# Patient Record
Sex: MALE | Race: WHITE | NOT HISPANIC OR LATINO | Employment: FULL TIME | URBAN - METROPOLITAN AREA
[De-identification: names, ages, dates, MRNs, and addresses within clinical notes are randomized per-mention and may not be internally consistent; named-entity substitution may affect disease eponyms.]

---

## 2020-05-07 ENCOUNTER — OFFICE VISIT (OUTPATIENT)
Dept: PODIATRY | Facility: CLINIC | Age: 45
End: 2020-05-07
Payer: COMMERCIAL

## 2020-05-07 VITALS
SYSTOLIC BLOOD PRESSURE: 147 MMHG | HEART RATE: 109 BPM | BODY MASS INDEX: 53.75 KG/M2 | DIASTOLIC BLOOD PRESSURE: 88 MMHG | HEIGHT: 75 IN

## 2020-05-07 DIAGNOSIS — M79.675 PAIN DUE TO ONYCHOMYCOSIS OF TOENAILS OF BOTH FEET: ICD-10-CM

## 2020-05-07 DIAGNOSIS — E11.65 UNCONTROLLED TYPE 2 DIABETES MELLITUS WITH HYPERGLYCEMIA: ICD-10-CM

## 2020-05-07 DIAGNOSIS — M79.675 TOE PAIN, BILATERAL: ICD-10-CM

## 2020-05-07 DIAGNOSIS — M79.674 PAIN DUE TO ONYCHOMYCOSIS OF TOENAILS OF BOTH FEET: ICD-10-CM

## 2020-05-07 DIAGNOSIS — B35.1 PAIN DUE TO ONYCHOMYCOSIS OF TOENAILS OF BOTH FEET: ICD-10-CM

## 2020-05-07 DIAGNOSIS — L60.2 NAIL DISORDER (ONYCHOGRYPHOSIS): Primary | ICD-10-CM

## 2020-05-07 DIAGNOSIS — M79.674 TOE PAIN, BILATERAL: ICD-10-CM

## 2020-05-07 PROCEDURE — 3008F BODY MASS INDEX DOCD: CPT | Mod: CPTII,S$GLB,, | Performed by: PODIATRIST

## 2020-05-07 PROCEDURE — 99999 PR PBB SHADOW E&M-NEW PATIENT-LVL III: ICD-10-PCS | Mod: PBBFAC,,, | Performed by: PODIATRIST

## 2020-05-07 PROCEDURE — 99203 PR OFFICE/OUTPT VISIT, NEW, LEVL III, 30-44 MIN: ICD-10-PCS | Mod: 25,S$GLB,, | Performed by: PODIATRIST

## 2020-05-07 PROCEDURE — 99203 OFFICE O/P NEW LOW 30 MIN: CPT | Mod: 25,S$GLB,, | Performed by: PODIATRIST

## 2020-05-07 PROCEDURE — 99999 PR PBB SHADOW E&M-NEW PATIENT-LVL III: CPT | Mod: PBBFAC,,, | Performed by: PODIATRIST

## 2020-05-07 PROCEDURE — 11721 PR DEBRIDEMENT OF NAILS, 6 OR MORE: ICD-10-PCS | Mod: S$GLB,,, | Performed by: PODIATRIST

## 2020-05-07 PROCEDURE — 3008F PR BODY MASS INDEX (BMI) DOCUMENTED: ICD-10-PCS | Mod: CPTII,S$GLB,, | Performed by: PODIATRIST

## 2020-05-07 PROCEDURE — 11721 DEBRIDE NAIL 6 OR MORE: CPT | Mod: S$GLB,,, | Performed by: PODIATRIST

## 2020-05-07 RX ORDER — CICLOPIROX 80 MG/ML
SOLUTION TOPICAL
Qty: 1 BOTTLE | Refills: 10 | Status: SHIPPED | OUTPATIENT
Start: 2020-05-07

## 2020-05-07 RX ORDER — PRAVASTATIN SODIUM 20 MG/1
20 TABLET ORAL DAILY
COMMUNITY

## 2020-05-07 RX ORDER — RAMIPRIL 10 MG/1
10 CAPSULE ORAL DAILY
COMMUNITY

## 2020-05-07 RX ORDER — METFORMIN HYDROCHLORIDE 1000 MG/1
1000 TABLET ORAL 2 TIMES DAILY WITH MEALS
COMMUNITY

## 2020-05-07 RX ORDER — INSULIN GLARGINE 300 U/ML
INJECTION, SOLUTION SUBCUTANEOUS
COMMUNITY
Start: 2020-01-31

## 2020-05-07 RX ORDER — GLIMEPIRIDE 2 MG/1
2 TABLET ORAL
COMMUNITY

## 2020-05-07 NOTE — PATIENT INSTRUCTIONS
PENLAC ANTI-FUNGAL TOPICAL INSTRUCTIONS   1. You have been prescribed Penlac nail lacquer (Ciclopirox) topical solution 8%. Go and  the prescription from your local pharmacy.    2. Remove any nail polish on your feet. File away (with emery board) loose nail material and trim nails.    3. Apply the Penlac nail lacquer (ciclopirox) Topical Solution, 8% once daily (preferably at bedtime or eight hours before washing) to all affected nails with the applicator brush provided. The nail lacquer should be applied evenly over the entire nail plate. If possible, apply the solution to the nail bed, hyponychium, and the under surface of the nail plate when if your nail is loosely attached. Remember fungus lives under the nail plate, therefore the more the solution penetrates the nail bed, the better.    3. Continue to apply the solution daily (at bedtime preferably).  Daily applications should be made over the previous coat and removed with alcohol every seven days. This cycle should be repeated throughout the duration of therapy.     4. On the 7th day, remove the solution from all of your nails with alcohol. File your nails again with an emery board and then repeat the cycle again.    5. This treatment must be consistent. If you skip a day, continue the cycle as recommended. It may take up to 1 year for your nails to clear the fungal infection. Be patient.    It is recommended to dispose of all infected shoe gear that you will not likely wear again as well as weekly cleansing of all showering/bathing areas with antiseptics.Fungal spores like to hide in dark, warm, moist environments. Lastly, monthly treatments of all current shoe gear with moth balls x 8 hours.                  FUNGAL NAIL INFECTIONS    If your nail is thick and looks white or yellow, it may be infected with fungus. Nail   infections don't look pleasant, but they are not serious. There are treatments that   can clear up the infection.     What is a  fungal nail infection?   It's easy to catch a fungal nail infection, and lots of people get them. The sooner you   treat an infection, the easier it is to get rid of it. The fungi that cause these infections often live in warm, damp places, such as showers and floors around changing rooms.   People used to think there was nothing you could do about a fungal nail infection. But   there are now good treatments that can get rid of it. However, they take a long time to   work (as long as one year if the infection is bad). So you need to be patient.    Fungal infection of the nails causes changes in the appearance of fingernails and toenails. They may thicken, discolor, change shape or split. This condition is difficult to treat because nails grow slowly and have limited blood supply. It is common to have the infection come back after treatment.       What are the symptoms?   Your nail may look whitish or yellowish, and raised up from the finger or toe. The skin   around your nail may turn red. Sometimes the nail lifts off altogether. If the infection is   severe, the nail may also be crumbly. It's more common to get an infection of a toenail   than a fingernail. If you've had an infection for a long time, it may be painful. If you have a badly infected toe, it may be difficult to walk. If your immune system is weak or you have diabetes, you may be more likely to get these infections. The infection can also be more serious. So it's important to see your doctor as soon as possible if you think you have a nail infection.     What treatments work?   To get rid of a fungal nail infection you will probably have to take tablets, sometimes for   several months. There are also topical solutions (over the counter and prescription) that  you can put on your nail, and  things you can do to try to avoid getting another nail infection.    There are two types of medicines used.   Topical anti-fungal medicines are applied to the  "surface of the skin and nail area. These medicines are not very effective because they cannot get deep into the nail.     Topical medicines are most useful in combination with oral medicines . Oral antifungal medicines are more effective because they penetrate the nail from the inside out.  If medicines fail, the nail can be removed surgically or chemically. This improves the effectiveness of medical treatment because the fungus is physically removed from the body.       Tablets   The tablets that doctors use most often are called itraconazole (brand name Sporanox)   and terbinafine (Lamisil). Terbinafine seems to work slightly better. If you take terbinafine every day for 12 weeks, there's a 6 in 10 chance you'll get rid of   your fungal toenail infection.      How are fungal nail infections treated? -- Treatment depends, in part, on how severe the infection is, and how much it bothers you. If your infection is mild or doesn't bother you very much, you might choose not to treat it. An untreated nail infection probably won't go away, but it probably won't cause any long-term problems either.  When people need or choose to have treatment, it usually involves "antifungal" medicines that you get with a prescription from your doctor. These medicines are taken by mouth or put on the nail.Treatment with pills usually lasts a few months. Some people who take these medicines need to have blood tests. That's because these medicines can affect the liver.  If you don't want to or can't take antifungal pills, your doctor will talk with you about other treatment options. These might include using an antifungal medicine on the nail or having surgery to remove your nail.    Before starting any of these treatments, you should know that:  ?It can take many months for your nail to look normal again.  ?There is a chance that the treatment won't work. The infection might not get better, or it might come back. If either of these things " happen, your doctor can try another treatment or send you to a specialist.  Can fungal nail infections be prevented? -- Sometimes. To reduce your chance of getting one, you can:  ?Keep your feet clean and dry.  ?Avoid sharing nail tools, such as clippers and scissors.  ?Wear flip-flops or other footwear in a gym shower or locker room.  What if I want to get pregnant? -- If you want to get pregnant, let your doctor or nurse know. He or she might recommend that you not take certain antifungal medicines during pregnancy.    Alternative final options are:   If still no resolution with oral tablets or topics: then we can completely avulse/remove all involved toenails with subsequent treatment with topical antifungal solution.       Home Care:   1) Use medicines exactly as directed for as long as directed. Treating a fungal infection can take longer than other kinds of infections.   2) Smoking is a risk factor for fungal infection. This is one more reason to quit.   3) Wear absorbent socks and shoes that have ventilation. Sweaty feet increases risk of fungal infection and make an existing infection harder to treat.   4) Use footwear when in damp public places like swimming pools, gyms and shower rooms. This helps avoid exposure to the fungus that grows there.   It is recommended to dispose of all infected shoe gear that you will not likely wear again as well as weekly cleansing of all showering/bathing areas with antiseptics.Fungal spores like to hide in dark, warm, moist environments. Lastly, monthly treatments of all current shoe gear with moth balls x 8 hours.       Follow Up   with your doctor as directed by our staff.   Get Prompt Medical Attention   if any of the following occur:   -- Skin alongside the nail becomes reddened, swollen, painful or drains pus   -- Side effects from oral anti-fungal medicines       © 3883-7650 [employer]. [address] [place] [address], [place] [address]. All rights reserved.  This information is not intended as a substitute for professional medical care. Always follow your healthcare professional's instructions.

## 2020-05-07 NOTE — PROGRESS NOTES
PODIATRIC MEDICINE AND SURGERY     Kirk Padilla MD- PCP, last Visit 1/2020    CHIEF COMPLAINT  Chief Complaint   Patient presents with    Ingrown Toenail     Bilateral hallux, bilateral borders incurvated. Left hallux lateral border tender to touch at times         HPI    SUBJECTIVE: Sebastian Ken is a 45 y.o. male who  has a past medical history of Diabetes mellitus, Lipodermatosclerosis, and Sciatica. Sebastian presenting to podiatry clinic with complaint of thickened, discolored, and painful toenails. Pt states nails result in pain with enclosed shoe wear aggravated due to pressure  and/or with ambulation. Left great toenail is the most painful. They are unable to trim nails. They are requesting nail care for relief of painful symptoms. Patient has no further pedal complaints.    Hemoglobin A1C   Date Value Ref Range Status   02/19/2011 10.1 (H) 4.0 - 6.2 % Final         PMH  Past Medical History:   Diagnosis Date    Diabetes mellitus     Lipodermatosclerosis     Sciatica      There is no problem list on file for this patient.      MEDS  Current Outpatient Medications on File Prior to Visit   Medication Sig Dispense Refill    glimepiride (AMARYL) 2 MG tablet Take 2 mg by mouth before breakfast.      metFORMIN (GLUCOPHAGE) 1000 MG tablet Take 1,000 mg by mouth 2 (two) times daily with meals.      naproxen (NAPROSYN) 500 MG tablet Take 1 tablet (500 mg total) by mouth 2 (two) times daily with meals. 20 tablet 0    pravastatin (PRAVACHOL) 20 MG tablet Take 20 mg by mouth once daily.      ramipriL (ALTACE) 10 MG capsule Take 10 mg by mouth once daily.      TOUJEO SOLOSTAR U-300 INSULIN 300 unit/mL (1.5 mL) InPn pen INJECT 40 UNITS UNDER THE SKIN ONCE D      naproxen (NAPROSYN) 500 MG tablet Take 1 tablet (500 mg total) by mouth 2 (two) times daily with meals. 20 tablet 0     No current facility-administered medications on file prior to visit.        PSH     Past Surgical History:   Procedure  "Laterality Date    CHOLECYSTECTOMY      KNEE SURGERY          ALL  Review of patient's allergies indicates:  No Known Allergies    SOC     Social History     Tobacco Use    Smoking status: Never Smoker   Substance Use Topics    Alcohol use: No    Drug use: No         Family HX  History reviewed. No pertinent family history.         REVIEW OF SYSTEMS  General: Denies any fever or chills  Chest: Denies shortness of breath, wheezing, coughing, or sputum production  Heart: Denies chest pain.  As noted above and per history of current illness above, otherwise negative in the remainder of the 14 systems.     PHYSICAL EXAM  Vitals:    05/07/20 1444   BP: (!) 147/88   Pulse: 109   Height: 6' 3" (1.905 m)   PainSc: 0-No pain       GEN:  This patient is well-developed, well-nourished and appears stated age, well-oriented to person, place and time, and cooperative and pleasant on today's visit.      LOWER EXTREMITY    VASCULAR  DP pedal pulse 2/4 RIGHT, LEFT2/4   PT pedal pulse 2/4 RIGHT, LEFT2/4  Capillary refill time immediate to the toes.   Feet are warm to the touch. Skin temperature warm to warm from proximally to distally   There are varicosities, telangiectasias noted to bilateral foot and ankle regions.   There are no ecchymoses noted to bilateral foot and ankle regions.   There is gross lower extremity edema.  There are chronic venous stasis changes     DERMATOLOGIC  Skin moist with healthy texture and turgor.  Thickened, dystrophic, elongated, discolored toenails with subungal debris 1,2, 5 RIGHT FOOT, 1, 2, , 5 LEFT FOOT    There are no open ulcerations, lacerations, or fissures to bilateral foot and ankle regions. There are no signs of infection as there is no erythema, no proximal-extending lymphangiitis, no fluctuance, or crepitus noted on palpation to bilateral foot and ankle regions.   There is no interdigital maceration.   There are no hyperkeratotic lesions noted to feet.    NEUROLOGIC  Neurological " sensation is grossly intact to all sites tested    ORTHOPEDIC/BIOMECHANICAL  No symptomatic structural abnormalities noted. Muscle strength is 5/5 for foot inverters, everters, plantarflexors, and dorsiflexors. Muscle tone is normal.   Inspection/palpation of bone, joints and muscles unremarkable.    ASSESSMENT  Nail disorder (onychogryphosis)    Uncontrolled type 2 diabetes mellitus with hyperglycemia    Pain due to onychomycosis of toenails of both feet    Other orders  -     ciclopirox (PENLAC) 8 % Soln; Apply to affected toenails at night time DAILY. On 7th day, file nails down, clean all nails with alcohol and restart application process.  Dispense: 1 Bottle; Refill: 10  -     urea (UMECTA NAIL FILM PEN) 40 % NFSP; Apply 1 application topically 2 (two) times daily. To affected toenail  Dispense: 3 g; Refill: 10        PLAN  -The patient was examined and evaulated. Patient was given verbal instructions on maintenance care for mycotic nails. The need for proper skin care in order to prevent infection and colonization in the nails was explained to the patient.    - The nails are painful with applied pressure and shoe gear. If a mycotic infection is left untreated, the infection could spread, causing marked limitation of ambulation, and/or make the patient prone to secondary infection. Failure to debride the nails at necessary intervals could likely cause recurrence of pain.   -I recommend Vicks vapor topically once daily to affected toenails. This is a natural antifungal regimen that will help in reduction of fungal load and will also soften the nail to allow for easier debridements.   -With patient's permission, the elongated onychomycotic toenails, as outlined in the physical examination, were sharply debrided with a double action nail nipper to their soft tissue attachment. If indicated, the nails were then smoothed down in thickness with an [place] board to facilitate in further debridement removing all  offending nail and subungual debris. Patient relates relief following the procedure.   -discussed total nail removal for LEFT  -discussed diabetic blood sugar control    Disclaimer: This note was partially prepared using a voice recognition system and is likely to have sound alike errors within the text.        No future appointments.    Report Electronically Signed By:     Elissa Landis DPM   Podiatry  [facility]  5/7/2020

## 2020-07-21 ENCOUNTER — HOSPITAL ENCOUNTER (EMERGENCY)
Facility: HOSPITAL | Age: 45
Discharge: HOME | End: 2020-07-21
Attending: EMERGENCY MEDICINE
Payer: COMMERCIAL

## 2020-07-21 VITALS
SYSTOLIC BLOOD PRESSURE: 136 MMHG | BODY MASS INDEX: 49.05 KG/M2 | DIASTOLIC BLOOD PRESSURE: 80 MMHG | OXYGEN SATURATION: 97 % | HEART RATE: 94 BPM | WEIGHT: 315 LBS | RESPIRATION RATE: 16 BRPM | TEMPERATURE: 98 F

## 2020-07-21 DIAGNOSIS — R07.9 CHEST PAIN: ICD-10-CM

## 2020-07-21 DIAGNOSIS — R07.89 ATYPICAL CHEST PAIN: Primary | ICD-10-CM

## 2020-07-21 LAB
ALBUMIN SERPL BCP-MCNC: 3.7 G/DL (ref 3.5–5.2)
ALP SERPL-CCNC: 116 U/L (ref 55–135)
ALT SERPL W/O P-5'-P-CCNC: 50 U/L (ref 10–44)
ANION GAP SERPL CALC-SCNC: 12 MMOL/L (ref 8–16)
AST SERPL-CCNC: 27 U/L (ref 10–40)
BASOPHILS # BLD AUTO: 0.06 K/UL (ref 0–0.2)
BASOPHILS NFR BLD: 0.6 % (ref 0–1.9)
BILIRUB SERPL-MCNC: 0.6 MG/DL (ref 0.1–1)
BNP SERPL-MCNC: <10 PG/ML (ref 0–99)
BUN SERPL-MCNC: 14 MG/DL (ref 6–20)
CALCIUM SERPL-MCNC: 9.4 MG/DL (ref 8.7–10.5)
CHLORIDE SERPL-SCNC: 101 MMOL/L (ref 95–110)
CO2 SERPL-SCNC: 24 MMOL/L (ref 23–29)
CREAT SERPL-MCNC: 0.8 MG/DL (ref 0.5–1.4)
DIFFERENTIAL METHOD: ABNORMAL
EOSINOPHIL # BLD AUTO: 0.3 K/UL (ref 0–0.5)
EOSINOPHIL NFR BLD: 3 % (ref 0–8)
ERYTHROCYTE [DISTWIDTH] IN BLOOD BY AUTOMATED COUNT: 14.7 % (ref 11.5–14.5)
EST. GFR  (AFRICAN AMERICAN): >60 ML/MIN/1.73 M^2
EST. GFR  (NON AFRICAN AMERICAN): >60 ML/MIN/1.73 M^2
GLUCOSE SERPL-MCNC: 345 MG/DL (ref 70–110)
HCT VFR BLD AUTO: 45.7 % (ref 40–54)
HGB BLD-MCNC: 15.1 G/DL (ref 14–18)
IMM GRANULOCYTES # BLD AUTO: 0.08 K/UL (ref 0–0.04)
IMM GRANULOCYTES NFR BLD AUTO: 0.9 % (ref 0–0.5)
LYMPHOCYTES # BLD AUTO: 3.8 K/UL (ref 1–4.8)
LYMPHOCYTES NFR BLD: 40.6 % (ref 18–48)
MCH RBC QN AUTO: 27.5 PG (ref 27–31)
MCHC RBC AUTO-ENTMCNC: 33 G/DL (ref 32–36)
MCV RBC AUTO: 83 FL (ref 82–98)
MONOCYTES # BLD AUTO: 0.5 K/UL (ref 0.3–1)
MONOCYTES NFR BLD: 5.2 % (ref 4–15)
NEUTROPHILS # BLD AUTO: 4.6 K/UL (ref 1.8–7.7)
NEUTROPHILS NFR BLD: 49.7 % (ref 38–73)
NRBC BLD-RTO: 0 /100 WBC
PLATELET # BLD AUTO: 221 K/UL (ref 150–350)
PMV BLD AUTO: 10 FL (ref 9.2–12.9)
POTASSIUM SERPL-SCNC: 4.3 MMOL/L (ref 3.5–5.1)
PROT SERPL-MCNC: 7.1 G/DL (ref 6–8.4)
RBC # BLD AUTO: 5.49 M/UL (ref 4.6–6.2)
SODIUM SERPL-SCNC: 137 MMOL/L (ref 136–145)
TROPONIN I SERPL DL<=0.01 NG/ML-MCNC: <0.006 NG/ML (ref 0–0.03)
WBC # BLD AUTO: 9.31 K/UL (ref 3.9–12.7)

## 2020-07-21 PROCEDURE — 99285 EMERGENCY DEPT VISIT HI MDM: CPT | Mod: 25

## 2020-07-21 PROCEDURE — 93010 ELECTROCARDIOGRAM REPORT: CPT | Mod: ,,, | Performed by: INTERNAL MEDICINE

## 2020-07-21 PROCEDURE — 93005 ELECTROCARDIOGRAM TRACING: CPT

## 2020-07-21 PROCEDURE — 93010 EKG 12-LEAD: ICD-10-PCS | Mod: ,,, | Performed by: INTERNAL MEDICINE

## 2020-07-21 PROCEDURE — 83880 ASSAY OF NATRIURETIC PEPTIDE: CPT

## 2020-07-21 PROCEDURE — 85025 COMPLETE CBC W/AUTO DIFF WBC: CPT

## 2020-07-21 PROCEDURE — 84484 ASSAY OF TROPONIN QUANT: CPT

## 2020-07-21 PROCEDURE — 36415 COLL VENOUS BLD VENIPUNCTURE: CPT

## 2020-07-21 PROCEDURE — 80053 COMPREHEN METABOLIC PANEL: CPT

## 2020-07-21 NOTE — ED PROVIDER NOTES
"SCRIBE #1 NOTE: I, Farheen Parra, am scribing for, and in the presence of, Reece Ramirez Do, MD. I have scribed the entire note.       History     Chief Complaint   Patient presents with    Chest Pain     Pt reports "fluttering" feeling in L breast and "weirdness" radiating L arm; onset couple hours PTA     Review of patient's allergies indicates:  No Known Allergies      History of Present Illness     HPI    7/21/2020, 3:44 AM  History obtained from the patient      History of Present Illness: Sebastian Ken is a 45 y.o. male patient with a PMHx of DM, sciatica, and heart cath (3-4 yrs ago with no blockages) who presents to the Emergency Department for evaluation of chest discomfort which onset suddenly at 10:00 PM yesterday. He reports it does not hurt and there is no pressure but "it just feels funny and feels like a fluttering sensation." Symptoms are intermittent and moderate in severity. No mitigating or exacerbating factors reported. No associated sxs. Patient denies any SOB, diaphoresis, palpitations, extremity weakness/numbness, leg pain/swelling, dizziness, cough, N/V, and all other sxs at this time. No prior Tx. No further complaints or concerns at this time.       Arrival mode: Personal vehicle    PCP: Kirk Padilla MD        Past Medical History:  Past Medical History:   Diagnosis Date    Diabetes mellitus     Lipodermatosclerosis     Sciatica        Past Surgical History:  Past Surgical History:   Procedure Laterality Date    CHOLECYSTECTOMY      KNEE SURGERY           Family History:  No family history on file.    Social History:  Social History     Tobacco Use    Smoking status: Never Smoker   Substance and Sexual Activity    Alcohol use: No    Drug use: No    Sexual activity: Not on file        Review of Systems     Review of Systems   Constitutional: Negative for activity change, chills, diaphoresis and fever.   HENT: Negative for congestion, rhinorrhea, sneezing, sore throat " and trouble swallowing.    Eyes: Negative for pain.   Respiratory: Negative for cough, chest tightness, shortness of breath, wheezing and stridor.    Cardiovascular: Negative for chest pain, palpitations and leg swelling.        (+) chest discomfort   Gastrointestinal: Negative for abdominal distention, abdominal pain, constipation, diarrhea, nausea and vomiting.   Genitourinary: Negative for difficulty urinating, dysuria, frequency and urgency.   Musculoskeletal: Negative for arthralgias, back pain, myalgias, neck pain and neck stiffness.   Skin: Negative for pallor, rash and wound.   Neurological: Negative for dizziness, syncope, weakness, light-headedness, numbness and headaches.   Hematological: Does not bruise/bleed easily.   All other systems reviewed and are negative.     Physical Exam     Initial Vitals [07/21/20 0016]   BP Pulse Resp Temp SpO2   (!) 174/96 88 20 98.3 °F (36.8 °C) 97 %      MAP       --          Physical Exam  Nursing Notes and Vital Signs Reviewed.  Constitutional: Patient is in no acute distress. Obese.   Head: Atraumatic. Normocephalic.  Eyes: . EOM intact. Conjunctivae are not pale. No scleral icterus.  ENT: Mucous membranes are moist. Oropharynx is clear and symmetric.    Neck: Supple. Full ROM.   Cardiovascular: Regular rate. Regular rhythm. No murmurs, rubs, or gallops. Distal pulses are 2+ and symmetric.  Pulmonary/Chest: No respiratory distress. Clear to auscultation bilaterally. No wheezing or rales. No chest wall tenderness.   Abdominal: Soft and non-distended.  There is no tenderness.  No rebound, guarding, or rigidity. Good bowel sounds.  Genitourinary: No CVA tenderness  Musculoskeletal: Moves all extremities. No obvious deformities. No edema. No calf tenderness. Meyers to lower legs consistent with venous stasis. Good pulses in BLE.   Skin: Warm and dry.  Neurological:  Alert, awake, and appropriate.  Normal speech.  No acute focal neurological deficits are  appreciated.  Psychiatric: Normal affect. Good eye contact. Appropriate in content.     ED Course   Procedures  ED Vital Signs:  Vitals:    07/21/20 0016 07/21/20 0259 07/21/20 0330 07/21/20 0430   BP: (!) 174/96  (!) 144/77 138/79   Pulse: 88 85 91 95   Resp: 20  20 10   Temp: 98.3 °F (36.8 °C)      TempSrc: Oral      SpO2: 97%  98% 98%   Weight: (!) 178 kg (392 lb 6.7 oz)       07/21/20 0500   BP: 136/80   Pulse: 94   Resp: 16   Temp: 98 °F (36.7 °C)   TempSrc: Oral   SpO2: 97%   Weight:        Abnormal Lab Results:  Labs Reviewed   CBC W/ AUTO DIFFERENTIAL - Abnormal; Notable for the following components:       Result Value    RDW 14.7 (*)     Immature Granulocytes 0.9 (*)     Immature Grans (Abs) 0.08 (*)     All other components within normal limits   COMPREHENSIVE METABOLIC PANEL - Abnormal; Notable for the following components:    Glucose 345 (*)     ALT 50 (*)     All other components within normal limits   TROPONIN I   B-TYPE NATRIURETIC PEPTIDE        All Lab Results:  Results for orders placed or performed during the hospital encounter of 07/21/20   CBC auto differential   Result Value Ref Range    WBC 9.31 3.90 - 12.70 K/uL    RBC 5.49 4.60 - 6.20 M/uL    Hemoglobin 15.1 14.0 - 18.0 g/dL    Hematocrit 45.7 40.0 - 54.0 %    Mean Corpuscular Volume 83 82 - 98 fL    Mean Corpuscular Hemoglobin 27.5 27.0 - 31.0 pg    Mean Corpuscular Hemoglobin Conc 33.0 32.0 - 36.0 g/dL    RDW 14.7 (H) 11.5 - 14.5 %    Platelets 221 150 - 350 K/uL    MPV 10.0 9.2 - 12.9 fL    Immature Granulocytes 0.9 (H) 0.0 - 0.5 %    Gran # (ANC) 4.6 1.8 - 7.7 K/uL    Immature Grans (Abs) 0.08 (H) 0.00 - 0.04 K/uL    Lymph # 3.8 1.0 - 4.8 K/uL    Mono # 0.5 0.3 - 1.0 K/uL    Eos # 0.3 0.0 - 0.5 K/uL    Baso # 0.06 0.00 - 0.20 K/uL    nRBC 0 0 /100 WBC    Gran% 49.7 38.0 - 73.0 %    Lymph% 40.6 18.0 - 48.0 %    Mono% 5.2 4.0 - 15.0 %    Eosinophil% 3.0 0.0 - 8.0 %    Basophil% 0.6 0.0 - 1.9 %    Differential Method Automated     Comprehensive metabolic panel   Result Value Ref Range    Sodium 137 136 - 145 mmol/L    Potassium 4.3 3.5 - 5.1 mmol/L    Chloride 101 95 - 110 mmol/L    CO2 24 23 - 29 mmol/L    Glucose 345 (H) 70 - 110 mg/dL    BUN, Bld 14 6 - 20 mg/dL    Creatinine 0.8 0.5 - 1.4 mg/dL    Calcium 9.4 8.7 - 10.5 mg/dL    Total Protein 7.1 6.0 - 8.4 g/dL    Albumin 3.7 3.5 - 5.2 g/dL    Total Bilirubin 0.6 0.1 - 1.0 mg/dL    Alkaline Phosphatase 116 55 - 135 U/L    AST 27 10 - 40 U/L    ALT 50 (H) 10 - 44 U/L    Anion Gap 12 8 - 16 mmol/L    eGFR if African American >60 >60 mL/min/1.73 m^2    eGFR if non African American >60 >60 mL/min/1.73 m^2   Troponin I #1   Result Value Ref Range    Troponin I <0.006 0.000 - 0.026 ng/mL   B-Type natriuretic peptide (BNP)   Result Value Ref Range    BNP <10 0 - 99 pg/mL         Imaging Results:  Imaging Results          X-Ray Chest AP Portable (In process)                5:07 AM: Per ED provider, pt's CXR shows: Cardiomegaly. No focal infiltrates.     The EKG was ordered, reviewed, and independently interpreted by the ED provider.  Interpretation time: 0:26  Rate: 89 BPM  Rhythm: normal sinus rhythm  Interpretation: No acute ST changes. No STEMI.           The Emergency Provider reviewed the vital signs and test results, which are outlined above.     ED Discussion     5:06 AM: Reassessed pt at this time. Discussed with pt all pertinent ED information and results. Discussed pt dx and plan of tx. Gave pt all f/u and return to the ED instructions. All questions and concerns were addressed at this time. Pt expresses understanding of information and instructions, and is comfortable with plan to discharge. Pt is stable for discharge.    I discussed with patient and/or family/caretaker that evaluation in the ED does not suggest any emergent or life threatening medical conditions requiring immediate intervention beyond what was provided in the ED, and I believe patient is safe for discharge.   Regardless, an unremarkable evaluation in the ED does not preclude the development or presence of a serious of life threatening condition. As such, patient was instructed to return immediately for any worsening or change in current symptoms.                   ED Medication(s):  Medications - No data to display    New Prescriptions    No medications on file       Follow-up Information     Kirk Padilla MD In 2 days.    Specialty: Family Medicine  Contact information:  [id number]  [facility] [address]  [phone]                       Scribe Attestation:   Scribe #1: I performed the above scribed service and the documentation accurately describes the services I performed. I attest to the accuracy of the note.     Attending:   Physician Attestation Statement for Scribe #1: I, Reece Ramirez Do, MD, personally performed the services described in this documentation, as scribed by Farheen Parra, in my presence, and it is both accurate and complete.           Clinical Impression       ICD-10-CM ICD-9-CM   1. Atypical chest pain  R07.89 786.59   2. Chest pain  R07.9 786.50       Disposition:   Disposition: Discharged  Condition: Stable         Reece Ramirez Do, MD  07/21/20 0538

## 2021-01-14 ENCOUNTER — OFFICE VISIT (OUTPATIENT)
Dept: PODIATRY | Facility: CLINIC | Age: 46
End: 2021-01-14
Payer: COMMERCIAL

## 2021-01-14 VITALS
BODY MASS INDEX: 39.17 KG/M2 | WEIGHT: 315 LBS | HEART RATE: 102 BPM | HEIGHT: 75 IN | DIASTOLIC BLOOD PRESSURE: 98 MMHG | SYSTOLIC BLOOD PRESSURE: 154 MMHG

## 2021-01-14 DIAGNOSIS — M79.674 PAIN DUE TO ONYCHOMYCOSIS OF TOENAILS OF BOTH FEET: ICD-10-CM

## 2021-01-14 DIAGNOSIS — E11.65 UNCONTROLLED TYPE 2 DIABETES MELLITUS WITH HYPERGLYCEMIA: Primary | ICD-10-CM

## 2021-01-14 DIAGNOSIS — L84 CORN OR CALLUS: ICD-10-CM

## 2021-01-14 DIAGNOSIS — L60.2 NAIL DISORDER (ONYCHOGRYPHOSIS): ICD-10-CM

## 2021-01-14 DIAGNOSIS — L60.0 INGROWN TOENAIL OF RIGHT FOOT: ICD-10-CM

## 2021-01-14 DIAGNOSIS — M79.675 PAIN DUE TO ONYCHOMYCOSIS OF TOENAILS OF BOTH FEET: ICD-10-CM

## 2021-01-14 DIAGNOSIS — E11.42 DM TYPE 2 WITH DIABETIC PERIPHERAL NEUROPATHY: ICD-10-CM

## 2021-01-14 DIAGNOSIS — B35.1 PAIN DUE TO ONYCHOMYCOSIS OF TOENAILS OF BOTH FEET: ICD-10-CM

## 2021-01-14 PROCEDURE — 3008F PR BODY MASS INDEX (BMI) DOCUMENTED: ICD-10-PCS | Mod: CPTII,S$GLB,, | Performed by: PODIATRIST

## 2021-01-14 PROCEDURE — 99214 OFFICE O/P EST MOD 30 MIN: CPT | Mod: 25,S$GLB,, | Performed by: PODIATRIST

## 2021-01-14 PROCEDURE — 11721 DEBRIDE NAIL 6 OR MORE: CPT | Mod: S$GLB,,, | Performed by: PODIATRIST

## 2021-01-14 PROCEDURE — 11721 PR DEBRIDEMENT OF NAILS, 6 OR MORE: ICD-10-PCS | Mod: S$GLB,,, | Performed by: PODIATRIST

## 2021-01-14 PROCEDURE — 99214 PR OFFICE/OUTPT VISIT, EST, LEVL IV, 30-39 MIN: ICD-10-PCS | Mod: 25,S$GLB,, | Performed by: PODIATRIST

## 2021-01-14 PROCEDURE — 99999 PR PBB SHADOW E&M-EST. PATIENT-LVL III: CPT | Mod: PBBFAC,,, | Performed by: PODIATRIST

## 2021-01-14 PROCEDURE — 99999 PR PBB SHADOW E&M-EST. PATIENT-LVL III: ICD-10-PCS | Mod: PBBFAC,,, | Performed by: PODIATRIST

## 2021-01-14 PROCEDURE — 3008F BODY MASS INDEX DOCD: CPT | Mod: CPTII,S$GLB,, | Performed by: PODIATRIST

## 2021-04-28 ENCOUNTER — PATIENT MESSAGE (OUTPATIENT)
Dept: RESEARCH | Facility: HOSPITAL | Age: 46
End: 2021-04-28

## 2021-06-08 ENCOUNTER — OFFICE VISIT (OUTPATIENT)
Dept: PODIATRY | Facility: CLINIC | Age: 46
End: 2021-06-08
Payer: COMMERCIAL

## 2021-06-08 VITALS — WEIGHT: 315 LBS | BODY MASS INDEX: 39.17 KG/M2 | HEIGHT: 75 IN

## 2021-06-08 DIAGNOSIS — E11.65 UNCONTROLLED TYPE 2 DIABETES MELLITUS WITH HYPERGLYCEMIA: ICD-10-CM

## 2021-06-08 DIAGNOSIS — L60.2 NAIL DISORDER (ONYCHOGRYPHOSIS): ICD-10-CM

## 2021-06-08 DIAGNOSIS — I87.2 VENOUS INSUFFICIENCY OF BOTH LOWER EXTREMITIES: Primary | ICD-10-CM

## 2021-06-08 DIAGNOSIS — E11.42 DM TYPE 2 WITH DIABETIC PERIPHERAL NEUROPATHY: ICD-10-CM

## 2021-06-08 PROCEDURE — 99999 PR PBB SHADOW E&M-EST. PATIENT-LVL III: ICD-10-PCS | Mod: PBBFAC,,, | Performed by: PODIATRIST

## 2021-06-08 PROCEDURE — 99999 PR PBB SHADOW E&M-EST. PATIENT-LVL III: CPT | Mod: PBBFAC,,, | Performed by: PODIATRIST

## 2021-06-08 PROCEDURE — 11721 DEBRIDE NAIL 6 OR MORE: CPT | Mod: Q9,S$GLB,, | Performed by: PODIATRIST

## 2021-06-08 PROCEDURE — 3008F BODY MASS INDEX DOCD: CPT | Mod: CPTII,S$GLB,, | Performed by: PODIATRIST

## 2021-06-08 PROCEDURE — 3008F PR BODY MASS INDEX (BMI) DOCUMENTED: ICD-10-PCS | Mod: CPTII,S$GLB,, | Performed by: PODIATRIST

## 2021-06-08 PROCEDURE — 11721 PR DEBRIDEMENT OF NAILS, 6 OR MORE: ICD-10-PCS | Mod: Q9,S$GLB,, | Performed by: PODIATRIST

## 2021-06-08 PROCEDURE — 99214 PR OFFICE/OUTPT VISIT, EST, LEVL IV, 30-39 MIN: ICD-10-PCS | Mod: 25,S$GLB,, | Performed by: PODIATRIST

## 2021-06-08 PROCEDURE — 99214 OFFICE O/P EST MOD 30 MIN: CPT | Mod: 25,S$GLB,, | Performed by: PODIATRIST

## 2021-06-08 RX ORDER — INSULIN GLARGINE AND LIXISENATIDE 100; 33 U/ML; UG/ML
INJECTION, SOLUTION SUBCUTANEOUS
COMMUNITY
Start: 2021-01-25

## 2021-06-08 RX ORDER — GABAPENTIN 300 MG/1
300 CAPSULE ORAL 2 TIMES DAILY
COMMUNITY
Start: 2021-01-15 | End: 2023-02-15 | Stop reason: SDUPTHER

## 2021-06-08 RX ORDER — CICLOPIROX 80 MG/ML
SOLUTION TOPICAL
Qty: 1 BOTTLE | Refills: 10 | Status: SHIPPED | OUTPATIENT
Start: 2021-06-08

## 2021-09-07 ENCOUNTER — OFFICE VISIT (OUTPATIENT)
Dept: PODIATRY | Facility: CLINIC | Age: 46
End: 2021-09-07
Payer: COMMERCIAL

## 2021-09-07 VITALS — WEIGHT: 315 LBS | HEIGHT: 75 IN | BODY MASS INDEX: 39.17 KG/M2

## 2021-09-07 DIAGNOSIS — E11.42 DM TYPE 2 WITH DIABETIC PERIPHERAL NEUROPATHY: ICD-10-CM

## 2021-09-07 DIAGNOSIS — E11.65 UNCONTROLLED TYPE 2 DIABETES MELLITUS WITH HYPERGLYCEMIA: Primary | ICD-10-CM

## 2021-09-07 DIAGNOSIS — L60.2 NAIL DISORDER (ONYCHOGRYPHOSIS): ICD-10-CM

## 2021-09-07 PROCEDURE — 11721 PR DEBRIDEMENT OF NAILS, 6 OR MORE: ICD-10-PCS | Mod: S$GLB,,, | Performed by: PODIATRIST

## 2021-09-07 PROCEDURE — 99213 PR OFFICE/OUTPT VISIT, EST, LEVL III, 20-29 MIN: ICD-10-PCS | Mod: 25,S$GLB,, | Performed by: PODIATRIST

## 2021-09-07 PROCEDURE — 99999 PR PBB SHADOW E&M-EST. PATIENT-LVL III: ICD-10-PCS | Mod: PBBFAC,,, | Performed by: PODIATRIST

## 2021-09-07 PROCEDURE — 3008F BODY MASS INDEX DOCD: CPT | Mod: CPTII,S$GLB,, | Performed by: PODIATRIST

## 2021-09-07 PROCEDURE — 11721 DEBRIDE NAIL 6 OR MORE: CPT | Mod: S$GLB,,, | Performed by: PODIATRIST

## 2021-09-07 PROCEDURE — 99999 PR PBB SHADOW E&M-EST. PATIENT-LVL III: CPT | Mod: PBBFAC,,, | Performed by: PODIATRIST

## 2021-09-07 PROCEDURE — 4010F ACE/ARB THERAPY RXD/TAKEN: CPT | Mod: CPTII,S$GLB,, | Performed by: PODIATRIST

## 2021-09-07 PROCEDURE — 1159F MED LIST DOCD IN RCRD: CPT | Mod: CPTII,S$GLB,, | Performed by: PODIATRIST

## 2021-09-07 PROCEDURE — 4010F PR ACE/ARB THEARPY RXD/TAKEN: ICD-10-PCS | Mod: CPTII,S$GLB,, | Performed by: PODIATRIST

## 2021-09-07 PROCEDURE — 3008F PR BODY MASS INDEX (BMI) DOCUMENTED: ICD-10-PCS | Mod: CPTII,S$GLB,, | Performed by: PODIATRIST

## 2021-09-07 PROCEDURE — 99213 OFFICE O/P EST LOW 20 MIN: CPT | Mod: 25,S$GLB,, | Performed by: PODIATRIST

## 2021-09-07 PROCEDURE — 1159F PR MEDICATION LIST DOCUMENTED IN MEDICAL RECORD: ICD-10-PCS | Mod: CPTII,S$GLB,, | Performed by: PODIATRIST

## 2021-09-07 RX ORDER — ORPHENADRINE CITRATE 100 MG/1
100 TABLET, EXTENDED RELEASE ORAL 2 TIMES DAILY PRN
COMMUNITY
Start: 2021-06-06

## 2021-09-07 RX ORDER — IBUPROFEN 800 MG/1
800 TABLET ORAL EVERY 8 HOURS PRN
COMMUNITY
Start: 2021-06-06

## 2021-09-07 RX ORDER — KETOROLAC TROMETHAMINE 10 MG/1
10 TABLET, FILM COATED ORAL EVERY 8 HOURS PRN
COMMUNITY
Start: 2021-06-16

## 2021-12-23 ENCOUNTER — HOSPITAL ENCOUNTER (EMERGENCY)
Facility: HOSPITAL | Age: 46
Discharge: HOME | End: 2021-12-23
Attending: EMERGENCY MEDICINE
Payer: COMMERCIAL

## 2021-12-23 VITALS
HEIGHT: 75 IN | BODY MASS INDEX: 39.17 KG/M2 | RESPIRATION RATE: 18 BRPM | HEART RATE: 98 BPM | OXYGEN SATURATION: 99 % | SYSTOLIC BLOOD PRESSURE: 134 MMHG | WEIGHT: 315 LBS | TEMPERATURE: 98 F | DIASTOLIC BLOOD PRESSURE: 84 MMHG

## 2021-12-23 DIAGNOSIS — R10.10 PAIN OF UPPER ABDOMEN: ICD-10-CM
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
## 2021-12-23 LAB
ALBUMIN SERPL BCP-MCNC: 4 G/DL (ref 3.5–5.2)
ALP SERPL-CCNC: 113 U/L (ref 55–135)
ALT SERPL W/O P-5'-P-CCNC: 48 U/L (ref 10–44)
ANION GAP SERPL CALC-SCNC: 13 MMOL/L (ref 8–16)
AST SERPL-CCNC: 29 U/L (ref 10–40)
BASOPHILS # BLD AUTO: 0.05 K/UL (ref 0–0.2)
BASOPHILS NFR BLD: 0.5 % (ref 0–1.9)
BILIRUB SERPL-MCNC: 0.6 MG/DL (ref 0.1–1)
BUN SERPL-MCNC: 12 MG/DL (ref 6–20)
CALCIUM SERPL-MCNC: 9.2 MG/DL (ref 8.7–10.5)
CHLORIDE SERPL-SCNC: 103 MMOL/L (ref 95–110)
CO2 SERPL-SCNC: 23 MMOL/L (ref 23–29)
CREAT SERPL-MCNC: 0.8 MG/DL (ref 0.5–1.4)
DIFFERENTIAL METHOD: ABNORMAL
EOSINOPHIL # BLD AUTO: 0.3 K/UL (ref 0–0.5)
EOSINOPHIL NFR BLD: 2.8 % (ref 0–8)
ERYTHROCYTE [DISTWIDTH] IN BLOOD BY AUTOMATED COUNT: 14.4 % (ref 11.5–14.5)
EST. GFR  (AFRICAN AMERICAN): >60 ML/MIN/1.73 M^2
EST. GFR  (NON AFRICAN AMERICAN): >60 ML/MIN/1.73 M^2
GLUCOSE SERPL-MCNC: 304 MG/DL (ref 70–110)
HCT VFR BLD AUTO: 44.5 % (ref 40–54)
HGB BLD-MCNC: 15.5 G/DL (ref 14–18)
IMM GRANULOCYTES # BLD AUTO: 0.06 K/UL (ref 0–0.04)
IMM GRANULOCYTES NFR BLD AUTO: 0.6 % (ref 0–0.5)
LYMPHOCYTES # BLD AUTO: 3.1 K/UL (ref 1–4.8)
LYMPHOCYTES NFR BLD: 30 % (ref 18–48)
MCH RBC QN AUTO: 28.2 PG (ref 27–31)
MCHC RBC AUTO-ENTMCNC: 34.8 G/DL (ref 32–36)
MCV RBC AUTO: 81 FL (ref 82–98)
MONOCYTES # BLD AUTO: 0.5 K/UL (ref 0.3–1)
MONOCYTES NFR BLD: 4.8 % (ref 4–15)
NEUTROPHILS # BLD AUTO: 6.3 K/UL (ref 1.8–7.7)
NEUTROPHILS NFR BLD: 61.3 % (ref 38–73)
NRBC BLD-RTO: 0 /100 WBC
PLATELET # BLD AUTO: 214 K/UL (ref 150–450)
PMV BLD AUTO: 9.4 FL (ref 9.2–12.9)
POTASSIUM SERPL-SCNC: 4.4 MMOL/L (ref 3.5–5.1)
PROT SERPL-MCNC: 7.1 G/DL (ref 6–8.4)
RBC # BLD AUTO: 5.49 M/UL (ref 4.6–6.2)
SODIUM SERPL-SCNC: 139 MMOL/L (ref 136–145)
WBC # BLD AUTO: 10.23 K/UL (ref 3.9–12.7)

## 2021-12-23 PROCEDURE — 25500020 PHARM REV CODE 255: Performed by: NURSE PRACTITIONER

## 2021-12-23 PROCEDURE — 80053 COMPREHEN METABOLIC PANEL: CPT | Performed by: NURSE PRACTITIONER

## 2021-12-23 PROCEDURE — 85025 COMPLETE CBC W/AUTO DIFF WBC: CPT | Performed by: NURSE PRACTITIONER

## 2021-12-23 PROCEDURE — 99285 EMERGENCY DEPT VISIT HI MDM: CPT | Mod: 25

## 2021-12-23 RX ORDER — TRAMADOL HYDROCHLORIDE 50 MG/1
50 TABLET ORAL EVERY 6 HOURS PRN
Qty: 12 TABLET | Refills: 0 | Status: SHIPPED | OUTPATIENT
Start: 2021-12-23

## 2021-12-23 RX ADMIN — IOHEXOL 100 ML: 350 INJECTION, SOLUTION INTRAVENOUS at 07:12
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
## 2021-12-24 NOTE — ED NOTES
Bed: TL 02  Expected date:   Expected time:   Means of arrival: Personal Transportation  Comments:

## 2022-04-07 ENCOUNTER — OFFICE VISIT (OUTPATIENT)
Dept: PODIATRY | Facility: CLINIC | Age: 47
End: 2022-04-07
Payer: COMMERCIAL

## 2022-04-07 VITALS — HEIGHT: 75 IN | BODY MASS INDEX: 39.17 KG/M2 | WEIGHT: 315 LBS

## 2022-04-07 DIAGNOSIS — I87.2 VENOUS INSUFFICIENCY OF BOTH LOWER EXTREMITIES: ICD-10-CM

## 2022-04-07 DIAGNOSIS — L60.2 NAIL DISORDER (ONYCHOGRYPHOSIS): ICD-10-CM

## 2022-04-07 DIAGNOSIS — E11.42 DM TYPE 2 WITH DIABETIC PERIPHERAL NEUROPATHY: ICD-10-CM

## 2022-04-07 DIAGNOSIS — E11.65 UNCONTROLLED TYPE 2 DIABETES MELLITUS WITH HYPERGLYCEMIA: Primary | ICD-10-CM

## 2022-04-07 PROCEDURE — 1159F MED LIST DOCD IN RCRD: CPT | Mod: CPTII,S$GLB,, | Performed by: PODIATRIST

## 2022-04-07 PROCEDURE — 99214 OFFICE O/P EST MOD 30 MIN: CPT | Mod: 25,S$GLB,, | Performed by: PODIATRIST

## 2022-04-07 PROCEDURE — 99999 PR PBB SHADOW E&M-EST. PATIENT-LVL III: CPT | Mod: PBBFAC,,, | Performed by: PODIATRIST

## 2022-04-07 PROCEDURE — 3008F PR BODY MASS INDEX (BMI) DOCUMENTED: ICD-10-PCS | Mod: CPTII,S$GLB,, | Performed by: PODIATRIST

## 2022-04-07 PROCEDURE — 11721 DEBRIDE NAIL 6 OR MORE: CPT | Mod: S$GLB,,, | Performed by: PODIATRIST

## 2022-04-07 PROCEDURE — 99999 PR PBB SHADOW E&M-EST. PATIENT-LVL III: ICD-10-PCS | Mod: PBBFAC,,, | Performed by: PODIATRIST

## 2022-04-07 PROCEDURE — 3008F BODY MASS INDEX DOCD: CPT | Mod: CPTII,S$GLB,, | Performed by: PODIATRIST

## 2022-04-07 PROCEDURE — 11721 PR DEBRIDEMENT OF NAILS, 6 OR MORE: ICD-10-PCS | Mod: S$GLB,,, | Performed by: PODIATRIST

## 2022-04-07 PROCEDURE — 99214 PR OFFICE/OUTPT VISIT, EST, LEVL IV, 30-39 MIN: ICD-10-PCS | Mod: 25,S$GLB,, | Performed by: PODIATRIST

## 2022-04-07 PROCEDURE — 1159F PR MEDICATION LIST DOCUMENTED IN MEDICAL RECORD: ICD-10-PCS | Mod: CPTII,S$GLB,, | Performed by: PODIATRIST

## 2022-04-07 NOTE — PROGRESS NOTES
PODIATRIC MEDICINE AND SURGERY      CHIEF COMPLAINT  Chief Complaint   Patient presents with    Routine Foot Care     0/10 pain at present. Diabetic PCP: Dr. Padilla last seen a while ago per pt.         HPI    SUBJECTIVE: Sebastian Ken is a 47 y.o. male who  has a past medical history of Diabetes mellitus, Lipodermatosclerosis, Neuropathy, and Sciatica. Sebastian presenting to podiatry clinic with complaint of thickened, discolored, and painful toenails. Pt states nails result in pain with enclosed shoe wear aggravated due to pressure  and/or with ambulation.  They are unable to trim nails. They are requesting nail care for relief of painful symptoms. He has been diagnosed with diabetic neuropathy. He is being managed by neurology. he does admit to numbness, tingling, and burning sensation to feet. He does have uncontrolled DM2. He admits to poor compliance. He has not seen PCP in several months.  Patient has no further pedal complaints.    Hemoglobin A1C   Date Value Ref Range Status   02/19/2011 10.1 (H) 4.0 - 6.2 % Final         PMH  Past Medical History:   Diagnosis Date    Diabetes mellitus     Lipodermatosclerosis     Neuropathy     Sciatica      There is no problem list on file for this patient.      MEDS  Current Outpatient Medications on File Prior to Visit   Medication Sig Dispense Refill    ciclopirox (PENLAC) 8 % Soln Apply to affected toenails at night time DAILY. On 7th day, file nails down, clean all nails with alcohol and restart application process. 1 Bottle 10    ciclopirox (PENLAC) 8 % Soln Apply to affected toenails at night time DAILY. On 7th day, file nails down, clean all nails with alcohol and restart application process. 1 Bottle 10    gabapentin (NEURONTIN) 300 MG capsule Take 300 mg by mouth 2 (two) times daily.      glimepiride (AMARYL) 2 MG tablet Take 2 mg by mouth before breakfast.      ibuprofen (ADVIL,MOTRIN) 800 MG tablet Take 800 mg by mouth every 8 (eight) hours as  needed.      ketorolac (TORADOL) 10 mg tablet Take 10 mg by mouth every 8 (eight) hours as needed.      metFORMIN (GLUCOPHAGE) 1000 MG tablet Take 1,000 mg by mouth 2 (two) times daily with meals.      naproxen (NAPROSYN) 500 MG tablet Take 1 tablet (500 mg total) by mouth 2 (two) times daily with meals. 20 tablet 0    naproxen (NAPROSYN) 500 MG tablet Take 1 tablet (500 mg total) by mouth 2 (two) times daily with meals. 20 tablet 0    orphenadrine (NORFLEX) 100 mg tablet Take 100 mg by mouth 2 (two) times daily as needed.      pravastatin (PRAVACHOL) 20 MG tablet Take 20 mg by mouth once daily.      ramipriL (ALTACE) 10 MG capsule Take 10 mg by mouth once daily.      SOLIQUA 100/33 100 unit-33 mcg/mL InPn pen ADMINISTER 35 UNITS UNDER THE SKIN DAILY AS DIRECTED      TOUJEO SOLOSTAR U-300 INSULIN 300 unit/mL (1.5 mL) InPn pen INJECT 40 UNITS UNDER THE SKIN ONCE D      traMADoL (ULTRAM) 50 mg tablet Take 1 tablet (50 mg total) by mouth every 6 (six) hours as needed for Pain. 12 tablet 0    urea (UMECTA NAIL FILM PEN) 40 % NFSP Apply 1 application topically 2 (two) times daily. To affected toenail 3 g 10     No current facility-administered medications on file prior to visit.       [facility]     Past Surgical History:   Procedure Laterality Date    CHOLECYSTECTOMY      KNEE SURGERY          ALL  Review of patient's allergies indicates:  No Known Allergies    SOC     Social History     Tobacco Use    Smoking status: Never Smoker    Smokeless tobacco: Never Used   Substance Use Topics    Alcohol use: No    Drug use: No         Family HX  History reviewed. No pertinent family history.       REVIEW OF SYSTEMS  General: This patient is well-developed, well-nourished and appears stated age, well-oriented to person, place and time, and cooperative and pleasant on today's visit  Constitutional: Negative for chills and fever.   Respiratory: Negative for shortness of breath.    Cardiovascular: Negative for chest  "pain, palpitations, orthopnea  Gastrointestinal: Negative for diarrhea, nausea and vomiting.   Musculoskeletal: Positive for above noted in HPI  Skin: Positive for skin and nail changes  Neurological: Positive for tingling and sensory changes  Peripheral Vascular: no claudication or cyanosis  Psychiatric/Behavioral: Negative for altered mental status       PHYSICAL EXAM  Vitals:    04/07/22 1512   Weight: (!) 167.9 kg (370 lb 2.4 oz)   Height: 6' 3" (1.905 m)   PainSc: 0-No pain       GEN:  This patient is well-developed, well-nourished and appears stated age, well-oriented to person, place and time, and cooperative and pleasant on today's visit.      LOWER EXTREMITY    VASCULAR  DP pedal pulse 2/4 RIGHT, LEFT2/4   PT pedal pulse 2/4 RIGHT, LEFT2/4  Capillary refill time immediate to the toes.   Feet are warm to the touch. Skin temperature warm to warm from proximally to distally   There are varicosities, telangiectasias noted to bilateral foot and ankle regions.   There are no ecchymoses noted to bilateral foot and ankle regions.   There is gross lower extremity edema.  There are chronic venous stasis changes     DERMATOLOGIC  Skin moist with healthy texture and turgor.  Thickened, dystrophic, elongated, discolored toenails with subungal debris 1,2, 5 RIGHT FOOT, 1, 2, , 5 LEFT FOOT    There are no open ulcerations, lacerations, or fissures to bilateral foot and ankle regions. There are no signs of infection as there is no erythema, no proximal-extending lymphangiitis, no fluctuance, or crepitus noted on palpation to bilateral foot and ankle regions.   There is no interdigital maceration.   There are diffuse  hyperkeratotic lesions noted to feet.    NEUROLOGIC  Neurological sensation is grossly intact to all sites tested    ORTHOPEDIC/BIOMECHANICAL  No symptomatic structural abnormalities noted. Muscle strength is 5/5 for foot inverters, everters, plantarflexors, and dorsiflexors. Muscle tone is normal. "   Inspection/palpation of bone, joints and muscles unremarkable.    ASSESSMENT  Uncontrolled type 2 diabetes mellitus with hyperglycemia    DM type 2 with diabetic peripheral neuropathy    Nail disorder (onychogryphosis)    Venous insufficiency of both lower extremities  -     COMPRESSION STOCKINGS        PLAN  -The patient was examined and evaulated. Patient was given verbal instructions on maintenance care for mycotic nails. The need for proper skin care in order to prevent infection and colonization in the nails was explained to the patient.      -With patient's permission, the elongated onychomycotic toenails, as outlined in the physical examination, were sharply debrided with a double action nail nipper to their soft tissue attachment. If indicated, the nails were then smoothed down in thickness with an devan board to facilitate in further debridement removing all offending nail and subungual debris. Patient relates relief following the procedure.     Discussed diabetic neuropathy and optimal glucose control is imperative. Patient states he is working on blood sugars and notes improvement.    Disclaimer: This note was partially prepared using a voice recognition system and is likely to have sound alike errors within the text.        No future appointments.    Report Electronically Signed By:     Elissa Landis DPM   Podiatry  [facility]  4/7/2022

## 2022-04-07 NOTE — PATIENT INSTRUCTIONS
Thank you for choosing [facility] and Dr. Elissa Landis to take care of you.      I have provided further information for you about your diagnoses and/or aftercare for treatment. With a combination of care, patient education, and following the provided recommendations, we will do our best to work together to alleviate your pain. Please do not hesitate to reach out to me via MyChart, email, or phone ([phone]) if you have any questions, comments, or concerns.      If you felt that you received exemplary care today, please consider leaving us feedback on the survey that you will receive in the next few days. Your feedback is important to us.        Sincerely,  Dr. Elissa Landis

## 2022-07-26 ENCOUNTER — HOSPITAL ENCOUNTER (EMERGENCY)
Facility: HOSPITAL | Age: 47
Discharge: HOME | End: 2022-07-26
Attending: EMERGENCY MEDICINE
Payer: COMMERCIAL

## 2022-07-26 VITALS
OXYGEN SATURATION: 98 % | TEMPERATURE: 98 F | SYSTOLIC BLOOD PRESSURE: 151 MMHG | BODY MASS INDEX: 39.17 KG/M2 | DIASTOLIC BLOOD PRESSURE: 87 MMHG | RESPIRATION RATE: 19 BRPM | HEIGHT: 75 IN | HEART RATE: 84 BPM | WEIGHT: 315 LBS

## 2022-07-26 DIAGNOSIS — R73.9 HYPERGLYCEMIA: ICD-10-CM

## 2022-07-26 DIAGNOSIS — R00.2 PALPITATIONS: Primary | ICD-10-CM

## 2022-07-26 LAB
ALBUMIN SERPL BCP-MCNC: 3.7 G/DL (ref 3.5–5.2)
ALP SERPL-CCNC: 108 U/L (ref 55–135)
ALT SERPL W/O P-5'-P-CCNC: 38 U/L (ref 10–44)
ANION GAP SERPL CALC-SCNC: 12 MMOL/L (ref 8–16)
AST SERPL-CCNC: 27 U/L (ref 10–40)
BASOPHILS # BLD AUTO: 0.06 K/UL (ref 0–0.2)
BASOPHILS NFR BLD: 0.7 % (ref 0–1.9)
BILIRUB SERPL-MCNC: 0.7 MG/DL (ref 0.1–1)
BNP SERPL-MCNC: <10 PG/ML (ref 0–99)
BUN SERPL-MCNC: 12 MG/DL (ref 6–20)
CALCIUM SERPL-MCNC: 9.1 MG/DL (ref 8.7–10.5)
CHLORIDE SERPL-SCNC: 103 MMOL/L (ref 95–110)
CO2 SERPL-SCNC: 21 MMOL/L (ref 23–29)
CREAT SERPL-MCNC: 0.8 MG/DL (ref 0.5–1.4)
DIFFERENTIAL METHOD: ABNORMAL
EOSINOPHIL # BLD AUTO: 0.2 K/UL (ref 0–0.5)
EOSINOPHIL NFR BLD: 2.9 % (ref 0–8)
ERYTHROCYTE [DISTWIDTH] IN BLOOD BY AUTOMATED COUNT: 14.6 % (ref 11.5–14.5)
EST. GFR  (AFRICAN AMERICAN): >60 ML/MIN/1.73 M^2
EST. GFR  (NON AFRICAN AMERICAN): >60 ML/MIN/1.73 M^2
GLUCOSE SERPL-MCNC: 362 MG/DL (ref 70–110)
HCT VFR BLD AUTO: 39.5 % (ref 40–54)
HGB BLD-MCNC: 13.9 G/DL (ref 14–18)
IMM GRANULOCYTES # BLD AUTO: 0.06 K/UL (ref 0–0.04)
IMM GRANULOCYTES NFR BLD AUTO: 0.7 % (ref 0–0.5)
LYMPHOCYTES # BLD AUTO: 3.3 K/UL (ref 1–4.8)
LYMPHOCYTES NFR BLD: 39.4 % (ref 18–48)
MAGNESIUM SERPL-MCNC: 1.8 MG/DL (ref 1.6–2.6)
MCH RBC QN AUTO: 28.3 PG (ref 27–31)
MCHC RBC AUTO-ENTMCNC: 35.2 G/DL (ref 32–36)
MCV RBC AUTO: 80 FL (ref 82–98)
MONOCYTES # BLD AUTO: 0.5 K/UL (ref 0.3–1)
MONOCYTES NFR BLD: 6.2 % (ref 4–15)
NEUTROPHILS # BLD AUTO: 4.1 K/UL (ref 1.8–7.7)
NEUTROPHILS NFR BLD: 50.1 % (ref 38–73)
NRBC BLD-RTO: 0 /100 WBC
PLATELET # BLD AUTO: 184 K/UL (ref 150–450)
PMV BLD AUTO: 9.9 FL (ref 9.2–12.9)
POCT GLUCOSE: 303 MG/DL (ref 70–110)
POTASSIUM SERPL-SCNC: 4 MMOL/L (ref 3.5–5.1)
PROT SERPL-MCNC: 6.8 G/DL (ref 6–8.4)
RBC # BLD AUTO: 4.92 M/UL (ref 4.6–6.2)
SODIUM SERPL-SCNC: 136 MMOL/L (ref 136–145)
TROPONIN I SERPL DL<=0.01 NG/ML-MCNC: <0.006 NG/ML (ref 0–0.03)
TSH SERPL DL<=0.005 MIU/L-ACNC: 2.06 UIU/ML (ref 0.4–4)
WBC # BLD AUTO: 8.25 K/UL (ref 3.9–12.7)

## 2022-07-26 PROCEDURE — 80053 COMPREHEN METABOLIC PANEL: CPT | Performed by: EMERGENCY MEDICINE

## 2022-07-26 PROCEDURE — 96374 THER/PROPH/DIAG INJ IV PUSH: CPT

## 2022-07-26 PROCEDURE — 84484 ASSAY OF TROPONIN QUANT: CPT | Performed by: EMERGENCY MEDICINE

## 2022-07-26 PROCEDURE — 96360 HYDRATION IV INFUSION INIT: CPT

## 2022-07-26 PROCEDURE — 84443 ASSAY THYROID STIM HORMONE: CPT | Performed by: EMERGENCY MEDICINE

## 2022-07-26 PROCEDURE — 96372 THER/PROPH/DIAG INJ SC/IM: CPT | Performed by: EMERGENCY MEDICINE

## 2022-07-26 PROCEDURE — 99284 EMERGENCY DEPT VISIT MOD MDM: CPT | Mod: 25

## 2022-07-26 PROCEDURE — 63600175 PHARM REV CODE 636 W HCPCS: Performed by: EMERGENCY MEDICINE

## 2022-07-26 PROCEDURE — 83880 ASSAY OF NATRIURETIC PEPTIDE: CPT | Performed by: EMERGENCY MEDICINE

## 2022-07-26 PROCEDURE — 85025 COMPLETE CBC W/AUTO DIFF WBC: CPT | Performed by: EMERGENCY MEDICINE

## 2022-07-26 PROCEDURE — 96361 HYDRATE IV INFUSION ADD-ON: CPT

## 2022-07-26 PROCEDURE — 25000003 PHARM REV CODE 250: Performed by: EMERGENCY MEDICINE

## 2022-07-26 PROCEDURE — 83735 ASSAY OF MAGNESIUM: CPT | Performed by: EMERGENCY MEDICINE

## 2022-07-26 PROCEDURE — 82962 GLUCOSE BLOOD TEST: CPT

## 2022-07-26 RX ORDER — ASPIRIN 325 MG
325 TABLET ORAL
Status: COMPLETED | OUTPATIENT
Start: 2022-07-26 | End: 2022-07-26

## 2022-07-26 RX ORDER — KETOROLAC TROMETHAMINE 30 MG/ML
30 INJECTION, SOLUTION INTRAMUSCULAR; INTRAVENOUS
Status: COMPLETED | OUTPATIENT
Start: 2022-07-26 | End: 2022-07-26

## 2022-07-26 RX ADMIN — KETOROLAC TROMETHAMINE 30 MG: 30 INJECTION, SOLUTION INTRAMUSCULAR; INTRAVENOUS at 04:07

## 2022-07-26 RX ADMIN — SODIUM CHLORIDE 1000 ML: 0.9 INJECTION, SOLUTION INTRAVENOUS at 04:07

## 2022-07-26 RX ADMIN — ASPIRIN 325 MG ORAL TABLET 325 MG: 325 PILL ORAL at 04:07

## 2022-07-26 RX ADMIN — INSULIN HUMAN 10 UNITS: 100 INJECTION, SOLUTION PARENTERAL at 05:07

## 2022-07-26 NOTE — ED PROVIDER NOTES
"SCRIBE #1 NOTE: I, Raciel Parker, am scribing for, and in the presence of, Nestor Coombs Jr., MD. I have scribed the entire note.       History     Chief Complaint   Patient presents with    Palpitations     Pt CO rapid, pounding heartbeat x2 days that started at rest. Pt reports radiating to L arm. Denies SOB     Review of patient's allergies indicates:  No Known Allergies      History of Present Illness     HPI    7/26/2022, 3:46 AM  History obtained from the patient      History of Present Illness: Sebastian Ken is a 47 y.o. male patient with a PMHx of DM, HTN who presents to the Emergency Department for evaluation of palpitations which onset gradually 2 days ago. Pt states he has been feeling discomfort in his chest that is described as "constant pressure" and was "too fast, too heavy" for him to sleep peacefully. He denies smoking. Symptoms are constant and moderate in severity. No mitigating or exacerbating factors reported. Associated sxs include decreased sleep, increased anxiety. Patient denies any fever, chills, HA, CP, SOB, leg swelling, weakness, numbness, n/v/d, and all other sxs at this time. No prior Tx reported. No further complaints or concerns at this time.       Arrival mode: Personal vehicle     PCP: Kirk Padilla MD        Past Medical History:  Past Medical History:   Diagnosis Date    Diabetes mellitus     Lipodermatosclerosis     Neuropathy     Sciatica        Past Surgical History:  Past Surgical History:   Procedure Laterality Date    CHOLECYSTECTOMY      KNEE SURGERY           Family History:  No family history on file.    Social History:  Social History     Tobacco Use    Smoking status: Never Smoker    Smokeless tobacco: Never Used   Substance and Sexual Activity    Alcohol use: No    Drug use: No    Sexual activity: Not on file        Review of Systems     Review of Systems   Constitutional: Positive for activity change (Decreased sleep). Negative for chills, " diaphoresis and fever.   HENT: Negative for congestion, rhinorrhea, sore throat and trouble swallowing.    Respiratory: Positive for chest tightness. Negative for cough and shortness of breath.    Cardiovascular: Positive for palpitations. Negative for chest pain and leg swelling.   Gastrointestinal: Negative for abdominal pain, diarrhea, nausea and vomiting.   Genitourinary: Negative for dysuria.   Musculoskeletal: Negative for back pain and myalgias.   Skin: Negative for rash.   Neurological: Negative for dizziness, weakness, numbness and headaches.   Hematological: Does not bruise/bleed easily.   Psychiatric/Behavioral: The patient is nervous/anxious.    All other systems reviewed and are negative.     Physical Exam     Initial Vitals [07/26/22 0330]   BP Pulse Resp Temp SpO2   (!) 143/87 95 14 98.3 °F (36.8 °C) 98 %      MAP       --          Physical Exam   Nursing Notes and Vital Signs Reviewed.  Constitutional: Patient is in no acute distress. Well-developed and well-nourished.  Head: Atraumatic. Normocephalic.  Eyes:  EOM intact.  No scleral icterus.  ENT: Mucous membranes are moist.  Nares clear   Neck:  Full ROM. No JVD.  Cardiovascular: Regular rate. Regular rhythm No murmurs, rubs, or gallops. Distal pulses are 2+ and symmetric  Pulmonary/Chest: No respiratory distress. Clear to auscultation bilaterally. No wheezing or rales.  Equal chest wall rise bilaterally  Abdominal: Soft and non-distended.  There is no tenderness.  No rebound, guarding, or rigidity. Good bowel sounds.  Genitourinary: No CVA tenderness.  No suprapubic tenderness  Musculoskeletal: Moves all extremities. No obvious deformities.  5 x 5 strength in all extremities   Skin: Warm and dry.  Neurological:  Alert, awake, and appropriate.  Normal speech.  No acute focal neurological deficits are appreciated.  Two through 12 intact bilaterally.  Psychiatric: Normal affect. Good eye contact. Appropriate in content.       ED Course  "  Procedures  ED Vital Signs:  Vitals:    07/26/22 0330 07/26/22 0400 07/26/22 0442 07/26/22 0500   BP: (!) 143/87 (!) 143/80 (!) 142/72 (!) 141/93   Pulse: 95 94 91 90   Resp: 14 19 (!) 21 (!) 25   Temp: 98.3 °F (36.8 °C) 98.3 °F (36.8 °C)  98.3 °F (36.8 °C)   TempSrc: Oral      SpO2: 98% 97% 98% 97%   Weight: (!) 163.4 kg (360 lb 3.7 oz)      Height: 6' 3" (1.905 m)       07/26/22 0522   BP: (!) 151/87   Pulse: 84   Resp: 19   Temp:    TempSrc:    SpO2: 98%   Weight:    Height:        Abnormal Lab Results:  Labs Reviewed   CBC W/ AUTO DIFFERENTIAL - Abnormal; Notable for the following components:       Result Value    Hemoglobin 13.9 (*)     Hematocrit 39.5 (*)     MCV 80 (*)     RDW 14.6 (*)     Immature Granulocytes 0.7 (*)     Immature Grans (Abs) 0.06 (*)     All other components within normal limits   COMPREHENSIVE METABOLIC PANEL - Abnormal; Notable for the following components:    CO2 21 (*)     Glucose 362 (*)     All other components within normal limits   POCT GLUCOSE - Abnormal; Notable for the following components:    POCT Glucose 303 (*)     All other components within normal limits   TROPONIN I   B-TYPE NATRIURETIC PEPTIDE   TSH   MAGNESIUM   POCT GLUCOSE MONITORING CONTINUOUS        All Lab Results:  Results for orders placed or performed during the hospital encounter of 07/26/22   CBC auto differential   Result Value Ref Range    WBC 8.25 3.90 - 12.70 K/uL    RBC 4.92 4.60 - 6.20 M/uL    Hemoglobin 13.9 (L) 14.0 - 18.0 g/dL    Hematocrit 39.5 (L) 40.0 - 54.0 %    MCV 80 (L) 82 - 98 fL    MCH 28.3 27.0 - 31.0 pg    MCHC 35.2 32.0 - 36.0 g/dL    RDW 14.6 (H) 11.5 - 14.5 %    Platelets 184 150 - 450 K/uL    MPV 9.9 9.2 - 12.9 fL    Immature Granulocytes 0.7 (H) 0.0 - 0.5 %    Gran # (ANC) 4.1 1.8 - 7.7 K/uL    Immature Grans (Abs) 0.06 (H) 0.00 - 0.04 K/uL    Lymph # 3.3 1.0 - 4.8 K/uL    Mono # 0.5 0.3 - 1.0 K/uL    Eos # 0.2 0.0 - 0.5 K/uL    Baso # 0.06 0.00 - 0.20 K/uL    nRBC 0 0 /100 WBC    Gran " % 50.1 38.0 - 73.0 %    Lymph % 39.4 18.0 - 48.0 %    Mono % 6.2 4.0 - 15.0 %    Eosinophil % 2.9 0.0 - 8.0 %    Basophil % 0.7 0.0 - 1.9 %    Differential Method Automated    Comprehensive metabolic panel   Result Value Ref Range    Sodium 136 136 - 145 mmol/L    Potassium 4.0 3.5 - 5.1 mmol/L    Chloride 103 95 - 110 mmol/L    CO2 21 (L) 23 - 29 mmol/L    Glucose 362 (H) 70 - 110 mg/dL    BUN 12 6 - 20 mg/dL    Creatinine 0.8 0.5 - 1.4 mg/dL    Calcium 9.1 8.7 - 10.5 mg/dL    Total Protein 6.8 6.0 - 8.4 g/dL    Albumin 3.7 3.5 - 5.2 g/dL    Total Bilirubin 0.7 0.1 - 1.0 mg/dL    Alkaline Phosphatase 108 55 - 135 U/L    AST 27 10 - 40 U/L    ALT 38 10 - 44 U/L    Anion Gap 12 8 - 16 mmol/L    eGFR if African American >60 >60 mL/min/1.73 m^2    eGFR if non African American >60 >60 mL/min/1.73 m^2   Troponin I #1   Result Value Ref Range    Troponin I <0.006 0.000 - 0.026 ng/mL   BNP   Result Value Ref Range    BNP <10 0 - 99 pg/mL   TSH   Result Value Ref Range    TSH 2.062 0.400 - 4.000 uIU/mL   Magnesium   Result Value Ref Range    Magnesium 1.8 1.6 - 2.6 mg/dL   POCT glucose   Result Value Ref Range    POCT Glucose 303 (H) 70 - 110 mg/dL         Imaging Results:  Imaging Results    None          The EKG was ordered, reviewed, and independently interpreted by the ED provider.  Interpretation time: 03:26  Rate: 97 BPM  Rhythm: normal sinus rhythm  Interpretation: Septal infarct, age undetermined. No STEMI.    The Emergency Provider reviewed the vital signs and test results, which are outlined above.     ED Discussion       5:27 AM: Reassessed pt at this time. Discussed with pt all pertinent ED information and results. Discussed pt dx and plan of tx. Gave pt all f/u and return to the ED instructions. All questions and concerns were addressed at this time. Pt expresses understanding of information and instructions, and is comfortable with plan to discharge. Pt is stable for discharge.    I discussed with patient  and/or family/caretaker that evaluation in the ED does not suggest any emergent or life threatening medical conditions requiring immediate intervention beyond what was provided in the ED, and I believe patient is safe for discharge.  Regardless, an unremarkable evaluation in the ED does not preclude the development or presence of a serious of life threatening condition. As such, patient was instructed to return immediately for any worsening or change in current symptoms.    5:31 AM  Patient is stable nontoxic.  Patient has had mild palpitations throughout the night.  Denies any chest pain.  No shortness of breath.  Is a nausea vomiting or diaphoresis.  He does have hypertension and diabetes but no underlying cardiac disease and notes that a year ago he was seen by Cardiology with full negative workup.  Patient has an elevated blood sugar and has been quite anxious throughout his stay.  These together likely attributing to his palpitations.  Patient is stable safe for discharge.  We have controlled his blood sugar.  I have recommended he follow-up with his cardiologist at next available for further evaluation.  He is return if his symptoms worsen in any way.  Patient verbalized agreement understanding with all and seems reliable.       Medical Decision Making:   Clinical Tests:   Lab Tests: Ordered and Reviewed  Medical Tests: Ordered and Reviewed           ED Medication(s):  Medications   aspirin tablet 325 mg (325 mg Oral Given 7/26/22 0413)   sodium chloride 0.9% bolus 1,000 mL (0 mLs Intravenous Stopped 7/26/22 0526)   ketorolac injection 30 mg (30 mg Intravenous Given 7/26/22 0413)   insulin regular injection 10 Units 0.1 mL (10 Units Subcutaneous Given 7/26/22 3147)       New Prescriptions    No medications on file        Follow-up Information     Kirk Padilla MD.    Specialty: Family Medicine  Contact information:  [address] [facility]  [address]  [phone]                             Scribe Attestation:   Scribe #1: I performed the above scribed service and the documentation accurately describes the services I performed. I attest to the accuracy of the note.     Attending:   Physician Attestation Statement for Scribe #1: I, Nestor Coombs Jr., MD, personally performed the services described in this documentation, as scribed by Raciel Parekr, in my presence, and it is both accurate and complete.           Clinical Impression       ICD-10-CM ICD-9-CM   1. Palpitations  R00.2 785.1   2. Hyperglycemia  R73.9 790.29       Disposition:   Disposition: Discharged  Condition: Stable         Nestor Coombs Jr., MD  07/26/22 0547

## 2022-07-26 NOTE — DISCHARGE INSTRUCTIONS
Cardiac workup here today is negative.  Appears that your bit dehydrated secondary to her elevated blood sugar in this is likely leading to her heart racing.  This is compounded with some anxiety.  Please continue home medications.  Follow your diet.  Follow you up with her cardiologist in 1-2 days for re-evaluation.  Return as needed for any worsening symptoms, problems, questions or concerns for

## 2022-08-10 ENCOUNTER — OFFICE VISIT (OUTPATIENT)
Dept: PODIATRY | Facility: CLINIC | Age: 47
End: 2022-08-10
Payer: COMMERCIAL

## 2022-08-10 VITALS — BODY MASS INDEX: 39.17 KG/M2 | WEIGHT: 315 LBS | HEIGHT: 75 IN

## 2022-08-10 DIAGNOSIS — E11.65 UNCONTROLLED TYPE 2 DIABETES MELLITUS WITH HYPERGLYCEMIA: ICD-10-CM

## 2022-08-10 DIAGNOSIS — E11.42 DM TYPE 2 WITH DIABETIC PERIPHERAL NEUROPATHY: ICD-10-CM

## 2022-08-10 DIAGNOSIS — L60.0 INGROWN TOENAIL OF RIGHT FOOT: Primary | ICD-10-CM

## 2022-08-10 DIAGNOSIS — L60.2 NAIL DISORDER (ONYCHOGRYPHOSIS): ICD-10-CM

## 2022-08-10 PROCEDURE — 1159F PR MEDICATION LIST DOCUMENTED IN MEDICAL RECORD: ICD-10-PCS | Mod: CPTII,S$GLB,, | Performed by: PODIATRIST

## 2022-08-10 PROCEDURE — 99999 PR PBB SHADOW E&M-EST. PATIENT-LVL IV: CPT | Mod: PBBFAC,,, | Performed by: PODIATRIST

## 2022-08-10 PROCEDURE — 3008F BODY MASS INDEX DOCD: CPT | Mod: CPTII,S$GLB,, | Performed by: PODIATRIST

## 2022-08-10 PROCEDURE — 99999 PR PBB SHADOW E&M-EST. PATIENT-LVL IV: ICD-10-PCS | Mod: PBBFAC,,, | Performed by: PODIATRIST

## 2022-08-10 PROCEDURE — 11721 PR DEBRIDEMENT OF NAILS, 6 OR MORE: ICD-10-PCS | Mod: S$GLB,,, | Performed by: PODIATRIST

## 2022-08-10 PROCEDURE — 3008F PR BODY MASS INDEX (BMI) DOCUMENTED: ICD-10-PCS | Mod: CPTII,S$GLB,, | Performed by: PODIATRIST

## 2022-08-10 PROCEDURE — 11721 DEBRIDE NAIL 6 OR MORE: CPT | Mod: S$GLB,,, | Performed by: PODIATRIST

## 2022-08-10 PROCEDURE — 1159F MED LIST DOCD IN RCRD: CPT | Mod: CPTII,S$GLB,, | Performed by: PODIATRIST

## 2022-08-10 PROCEDURE — 99213 PR OFFICE/OUTPT VISIT, EST, LEVL III, 20-29 MIN: ICD-10-PCS | Mod: 25,S$GLB,, | Performed by: PODIATRIST

## 2022-08-10 PROCEDURE — 99213 OFFICE O/P EST LOW 20 MIN: CPT | Mod: 25,S$GLB,, | Performed by: PODIATRIST

## 2022-08-10 NOTE — PROGRESS NOTES
PODIATRIC MEDICINE AND SURGERY      CHIEF COMPLAINT  Chief Complaint   Patient presents with    Nail Care     Nail Care, diabetic wears casual shoes and socks   PCP: Dr. Padilla, August 2022      HPI    SUBJECTIVE: Sebastian Ken is a 47 y.o. male who  has a past medical history of Diabetes mellitus, Diabetes mellitus, type 2, Hyperlipidemia, Hypertension, Lipodermatosclerosis, Neuropathy, and Sciatica. Sebastian presenting to podiatry clinic with complaint of thickened, discolored, and painful toenails. Pt states nails result in pain with enclosed shoe wear aggravated due to pressure  and/or with ambulation.  They are unable to trim nails. They are requesting nail care for relief of painful symptoms. He has been diagnosed with diabetic neuropathy. He is being managed by neurology. he does admit to numbness, tingling, and burning sensation to feet. He does have uncontrolled DM2.   Patient has no further pedal complaints.    Hemoglobin A1C   Date Value Ref Range Status   02/19/2011 10.1 (H) 4.0 - 6.2 % Final         PMH  Past Medical History:   Diagnosis Date    Diabetes mellitus     Diabetes mellitus, type 2     Hyperlipidemia     Hypertension     Lipodermatosclerosis     Neuropathy     Sciatica      There is no problem list on file for this patient.      MEDS  Current Outpatient Medications on File Prior to Visit   Medication Sig Dispense Refill    ciclopirox (PENLAC) 8 % Soln Apply to affected toenails at night time DAILY. On 7th day, file nails down, clean all nails with alcohol and restart application process. 1 Bottle 10    ciclopirox (PENLAC) 8 % Soln Apply to affected toenails at night time DAILY. On 7th day, file nails down, clean all nails with alcohol and restart application process. 1 Bottle 10    gabapentin (NEURONTIN) 300 MG capsule Take 300 mg by mouth 2 (two) times daily.      glimepiride (AMARYL) 2 MG tablet Take 2 mg by mouth before breakfast.      ibuprofen (ADVIL,MOTRIN) 800 MG  tablet Take 800 mg by mouth every 8 (eight) hours as needed.      ketorolac (TORADOL) 10 mg tablet Take 10 mg by mouth every 8 (eight) hours as needed.      metFORMIN (GLUCOPHAGE) 1000 MG tablet Take 1,000 mg by mouth 2 (two) times daily with meals.      naproxen (NAPROSYN) 500 MG tablet Take 1 tablet (500 mg total) by mouth 2 (two) times daily with meals. 20 tablet 0    naproxen (NAPROSYN) 500 MG tablet Take 1 tablet (500 mg total) by mouth 2 (two) times daily with meals. 20 tablet 0    orphenadrine (NORFLEX) 100 mg tablet Take 100 mg by mouth 2 (two) times daily as needed.      pravastatin (PRAVACHOL) 20 MG tablet Take 20 mg by mouth once daily.      ramipriL (ALTACE) 10 MG capsule Take 10 mg by mouth once daily.      SOLIQUA 100/33 100 unit-33 mcg/mL InPn pen ADMINISTER 35 UNITS UNDER THE SKIN DAILY AS DIRECTED      TOUJEO SOLOSTAR U-300 INSULIN 300 unit/mL (1.5 mL) InPn pen INJECT 40 UNITS UNDER THE SKIN ONCE D      traMADoL (ULTRAM) 50 mg tablet Take 1 tablet (50 mg total) by mouth every 6 (six) hours as needed for Pain. 12 tablet 0    urea (UMECTA NAIL FILM PEN) 40 % NFSP Apply 1 application topically 2 (two) times daily. To affected toenail 3 g 10     No current facility-administered medications on file prior to visit.       PSH     Past Surgical History:   Procedure Laterality Date    CHOLECYSTECTOMY      KNEE SURGERY          ALL  Review of patient's allergies indicates:  No Known Allergies    SOC     Social History     Tobacco Use    Smoking status: Never Smoker    Smokeless tobacco: Never Used   Substance Use Topics    Alcohol use: No    Drug use: No         Family HX  History reviewed. No pertinent family history.       REVIEW OF SYSTEMS  General: This patient is well-developed, well-nourished and appears stated age, well-oriented to person, place and time, and cooperative and pleasant on today's visit  Constitutional: Negative for chills and fever.   Respiratory: Negative for shortness  "of breath.    Cardiovascular: Negative for chest pain, palpitations, orthopnea  Gastrointestinal: Negative for diarrhea, nausea and vomiting.   Musculoskeletal: Positive for above noted in HPI  Skin: Positive for skin and nail changes  Neurological: Positive for tingling and sensory changes  Peripheral Vascular: no claudication or cyanosis  Psychiatric/Behavioral: Negative for altered mental status       PHYSICAL EXAM  Vitals:    08/10/22 0801   Weight: (!) 163.3 kg (360 lb)   Height: 6' 3" (1.905 m)   PainSc: 0-No pain       GEN:  This patient is well-developed, well-nourished and appears stated age, well-oriented to person, place and time, and cooperative and pleasant on today's visit.      LOWER EXTREMITY    VASCULAR  DP pedal pulse 2/4 RIGHT, LEFT2/4   PT pedal pulse 2/4 RIGHT, LEFT2/4  Capillary refill time immediate to the toes.   Feet are warm to the touch. Skin temperature warm to warm from proximally to distally   There are varicosities, telangiectasias noted to bilateral foot and ankle regions.   There are no ecchymoses noted to bilateral foot and ankle regions.   There is gross lower extremity edema.  There are chronic venous stasis changes     DERMATOLOGIC  Skin moist with healthy texture and turgor.  Thickened, dystrophic, elongated, discolored toenails with subungal debris 1,2, 5 RIGHT FOOT, 1, 2, , 5 LEFT FOOT    Medial border incurvated RIGHT hallux  There are no open ulcerations, lacerations, or fissures to bilateral foot and ankle regions. There are no signs of infection as there is no erythema, no proximal-extending lymphangiitis, no fluctuance, or crepitus noted on palpation to bilateral foot and ankle regions.   There is no interdigital maceration.   There are diffuse  hyperkeratotic lesions noted to feet.    NEUROLOGIC  Neurological sensation is grossly intact to all sites tested    ORTHOPEDIC/BIOMECHANICAL  No symptomatic structural abnormalities noted. Muscle strength is 5/5 for foot " inverters, everters, plantarflexors, and dorsiflexors. Muscle tone is normal.   Inspection/palpation of bone, joints and muscles unremarkable.    ASSESSMENT  Ingrown toenail of right foot    Uncontrolled type 2 diabetes mellitus with hyperglycemia    DM type 2 with diabetic peripheral neuropathy    Nail disorder (onychogryphosis)        PLAN  -The patient was examined and evaulated. Patient was given verbal instructions on maintenance care for mycotic nails. The need for proper skin care in order to prevent infection and colonization in the nails was explained to the patient.      Utilizing sterile toenail clippers I aggressively trimmed the offending nail border approximately 3 mm from its edge and carried the nail plate incision down at an angle in order to wedge out the offending cryptotic portion of the nail plate. The offending border was then removed in toto. The remaining nail was grinded down with an electric  down to nail bed. Minimal blood was drawn. Applied betadine and covered with band-aid. Patient tolerated the procedure well and related significant relief.    -With patient's permission, the elongated onychomycotic toenails, as outlined in the physical examination, were sharply debrided with a double action nail nipper to their soft tissue attachment. If indicated, the nails were then smoothed down in thickness with an devan board to facilitate in further debridement removing all offending nail and subungual debris. Patient relates relief following the procedure.     Discussed diabetic neuropathy and optimal glucose control is imperative.    Disclaimer: This note was partially prepared using a voice recognition system and is likely to have sound alike errors within the text.        Future Appointments   Date Time Provider Department Center   11/9/2022  8:00 AM Elissa Landis DPM [facility]       Report Electronically Signed By:     Elissa Landis DPM   Podiatry  [facility]-   8/10/2022

## 2022-11-09 ENCOUNTER — OFFICE VISIT (OUTPATIENT)
Dept: PODIATRY | Facility: CLINIC | Age: 47
End: 2022-11-09
Payer: COMMERCIAL

## 2022-11-09 VITALS — WEIGHT: 315 LBS | HEIGHT: 75 IN | BODY MASS INDEX: 39.17 KG/M2

## 2022-11-09 DIAGNOSIS — M79.675 PAIN DUE TO ONYCHOMYCOSIS OF TOENAILS OF BOTH FEET: ICD-10-CM

## 2022-11-09 DIAGNOSIS — L60.2 NAIL DISORDER (ONYCHOGRYPHOSIS): ICD-10-CM

## 2022-11-09 DIAGNOSIS — M79.674 PAIN DUE TO ONYCHOMYCOSIS OF TOENAILS OF BOTH FEET: ICD-10-CM

## 2022-11-09 DIAGNOSIS — E11.42 DM TYPE 2 WITH DIABETIC PERIPHERAL NEUROPATHY: ICD-10-CM

## 2022-11-09 DIAGNOSIS — B35.1 PAIN DUE TO ONYCHOMYCOSIS OF TOENAILS OF BOTH FEET: ICD-10-CM

## 2022-11-09 DIAGNOSIS — E11.65 UNCONTROLLED TYPE 2 DIABETES MELLITUS WITH HYPERGLYCEMIA: Primary | ICD-10-CM

## 2022-11-09 PROCEDURE — 3008F BODY MASS INDEX DOCD: CPT | Mod: CPTII,S$GLB,, | Performed by: PODIATRIST

## 2022-11-09 PROCEDURE — 1159F MED LIST DOCD IN RCRD: CPT | Mod: CPTII,S$GLB,, | Performed by: PODIATRIST

## 2022-11-09 PROCEDURE — 1159F PR MEDICATION LIST DOCUMENTED IN MEDICAL RECORD: ICD-10-PCS | Mod: CPTII,S$GLB,, | Performed by: PODIATRIST

## 2022-11-09 PROCEDURE — 99213 OFFICE O/P EST LOW 20 MIN: CPT | Mod: 25,S$GLB,, | Performed by: PODIATRIST

## 2022-11-09 PROCEDURE — 99999 PR PBB SHADOW E&M-EST. PATIENT-LVL IV: ICD-10-PCS | Mod: PBBFAC,,, | Performed by: PODIATRIST

## 2022-11-09 PROCEDURE — 11721 PR DEBRIDEMENT OF NAILS, 6 OR MORE: ICD-10-PCS | Mod: S$GLB,,, | Performed by: PODIATRIST

## 2022-11-09 PROCEDURE — 99999 PR PBB SHADOW E&M-EST. PATIENT-LVL IV: CPT | Mod: PBBFAC,,, | Performed by: PODIATRIST

## 2022-11-09 PROCEDURE — 99213 PR OFFICE/OUTPT VISIT, EST, LEVL III, 20-29 MIN: ICD-10-PCS | Mod: 25,S$GLB,, | Performed by: PODIATRIST

## 2022-11-09 PROCEDURE — 11721 DEBRIDE NAIL 6 OR MORE: CPT | Mod: S$GLB,,, | Performed by: PODIATRIST

## 2022-11-09 PROCEDURE — 3008F PR BODY MASS INDEX (BMI) DOCUMENTED: ICD-10-PCS | Mod: CPTII,S$GLB,, | Performed by: PODIATRIST

## 2022-11-10 NOTE — PROGRESS NOTES
PODIATRIC MEDICINE AND SURGERY      CHIEF COMPLAINT  Chief Complaint   Patient presents with    Routine Foot Care     Routine foot care, diabetic wears tennis shoes and socks, Last seen PCP Dr. Padilla 10/2022         HPI    SUBJECTIVE: Sebastian Ken is a 47 y.o. male who  has a past medical history of Diabetes mellitus, Diabetes mellitus, type 2, Hyperlipidemia, Hypertension, Lipodermatosclerosis, Neuropathy, and Sciatica. Sebastian presenting to podiatry clinic with complaint of thickened, discolored, and painful toenails. Pt states nails result in pain with enclosed shoe wear aggravated due to pressure  and/or with ambulation.  They are unable to trim nails. They are requesting nail care for relief of painful symptoms. He has been diagnosed with diabetic neuropathy. He is being managed by neurology. he does admit to numbness, tingling, and burning sensation to feet. He does have uncontrolled DM2. He admits to poor compliance. He has not seen PCP in several months.  Patient has no further pedal complaints.    Hemoglobin A1C   Date Value Ref Range Status   02/19/2011 10.1 (H) 4.0 - 6.2 % Final         PMH  Past Medical History:   Diagnosis Date    Diabetes mellitus     Diabetes mellitus, type 2     Hyperlipidemia     Hypertension     Lipodermatosclerosis     Neuropathy     Sciatica      There is no problem list on file for this patient.      MEDS  Current Outpatient Medications on File Prior to Visit   Medication Sig Dispense Refill    ciclopirox (PENLAC) 8 % Soln Apply to affected toenails at night time DAILY. On 7th day, file nails down, clean all nails with alcohol and restart application process. 1 Bottle 10    ciclopirox (PENLAC) 8 % Soln Apply to affected toenails at night time DAILY. On 7th day, file nails down, clean all nails with alcohol and restart application process. 1 Bottle 10    gabapentin (NEURONTIN) 300 MG capsule Take 300 mg by mouth 2 (two) times daily.      glimepiride (AMARYL) 2 MG tablet  Take 2 mg by mouth before breakfast.      ibuprofen (ADVIL,MOTRIN) 800 MG tablet Take 800 mg by mouth every 8 (eight) hours as needed.      ketorolac (TORADOL) 10 mg tablet Take 10 mg by mouth every 8 (eight) hours as needed.      metFORMIN (GLUCOPHAGE) 1000 MG tablet Take 1,000 mg by mouth 2 (two) times daily with meals.      naproxen (NAPROSYN) 500 MG tablet Take 1 tablet (500 mg total) by mouth 2 (two) times daily with meals. 20 tablet 0    naproxen (NAPROSYN) 500 MG tablet Take 1 tablet (500 mg total) by mouth 2 (two) times daily with meals. 20 tablet 0    orphenadrine (NORFLEX) 100 mg tablet Take 100 mg by mouth 2 (two) times daily as needed.      pravastatin (PRAVACHOL) 20 MG tablet Take 20 mg by mouth once daily.      ramipriL (ALTACE) 10 MG capsule Take 10 mg by mouth once daily.      SOLIQUA 100/33 100 unit-33 mcg/mL InPn pen ADMINISTER 35 UNITS UNDER THE SKIN DAILY AS DIRECTED      TOUJEO SOLOSTAR U-300 INSULIN 300 unit/mL (1.5 mL) InPn pen INJECT 40 UNITS UNDER THE SKIN ONCE D      traMADoL (ULTRAM) 50 mg tablet Take 1 tablet (50 mg total) by mouth every 6 (six) hours as needed for Pain. 12 tablet 0    urea (UMECTA NAIL FILM PEN) 40 % NFSP Apply 1 application topically 2 (two) times daily. To affected toenail 3 g 10     No current facility-administered medications on file prior to visit.       PS     Past Surgical History:   Procedure Laterality Date    CHOLECYSTECTOMY      KNEE SURGERY          ALL  Review of patient's allergies indicates:  No Known Allergies    SOC     Social History     Tobacco Use    Smoking status: Never    Smokeless tobacco: Never   Substance Use Topics    Alcohol use: No    Drug use: No         Family HX  History reviewed. No pertinent family history.       REVIEW OF SYSTEMS  General: This patient is well-developed, well-nourished and appears stated age, well-oriented to person, place and time, and cooperative and pleasant on today's visit  Constitutional: Negative for chills and  "fever.   Respiratory: Negative for shortness of breath.    Cardiovascular: Negative for chest pain, palpitations, orthopnea  Gastrointestinal: Negative for diarrhea, nausea and vomiting.   Musculoskeletal: Positive for above noted in HPI  Skin: Positive for skin and nail changes  Neurological: Positive for tingling and sensory changes  Peripheral Vascular: no claudication or cyanosis  Psychiatric/Behavioral: Negative for altered mental status       PHYSICAL EXAM  Vitals:    11/09/22 0804   Weight: (!) 163.3 kg (360 lb)   Height: 6' 3" (1.905 m)   PainSc: 0-No pain       GEN:  This patient is well-developed, well-nourished and appears stated age, well-oriented to person, place and time, and cooperative and pleasant on today's visit.      LOWER EXTREMITY    VASCULAR  DP pedal pulse 2/4 RIGHT, LEFT2/4   PT pedal pulse 2/4 RIGHT, LEFT2/4  Capillary refill time immediate to the toes.   Feet are warm to the touch. Skin temperature warm to warm from proximally to distally   There are varicosities, telangiectasias noted to bilateral foot and ankle regions.   There are no ecchymoses noted to bilateral foot and ankle regions.   There is gross lower extremity edema.  There are chronic venous stasis changes     DERMATOLOGIC  Skin moist with healthy texture and turgor.  Thickened, dystrophic, elongated, discolored toenails with subungal debris 1,2, 5 RIGHT FOOT, 1, 2, , 5 LEFT FOOT    There are no open ulcerations, lacerations, or fissures to bilateral foot and ankle regions. There are no signs of infection as there is no erythema, no proximal-extending lymphangiitis, no fluctuance, or crepitus noted on palpation to bilateral foot and ankle regions.   There is no interdigital maceration.   There are diffuse  hyperkeratotic lesions noted to feet.    NEUROLOGIC  Neurological sensation is grossly intact to all sites tested    ORTHOPEDIC/BIOMECHANICAL  No symptomatic structural abnormalities noted. Muscle strength is 5/5 for foot " inverters, everters, plantarflexors, and dorsiflexors. Muscle tone is normal.   Inspection/palpation of bone, joints and muscles unremarkable.    ASSESSMENT  Uncontrolled type 2 diabetes mellitus with hyperglycemia    DM type 2 with diabetic peripheral neuropathy    Nail disorder (onychogryphosis)    Pain due to onychomycosis of toenails of both feet        PLAN  -The patient was examined and evaulated. Patient was given verbal instructions on maintenance care for mycotic nails. The need for proper skin care in order to prevent infection and colonization in the nails was explained to the patient.      -With patient's permission, the elongated onychomycotic toenails, as outlined in the physical examination, were sharply debrided with a double action nail nipper to their soft tissue attachment. If indicated, the nails were then smoothed down in thickness with an devan board to facilitate in further debridement removing all offending nail and subungual debris. Patient relates relief following the procedure.     Discussed diabetic neuropathy and optimal glucose control is imperative. Patient states he is working on blood sugars and notes improvement.    Disclaimer: This note was partially prepared using a voice recognition system and is likely to have sound alike errors within the text.        Future Appointments   Date Time Provider Department Center   2/15/2023  8:00 AM Elissa Landis DPM [facility] [place]       Report Electronically Signed By:     Elissa Landis DPM   Podiatry  [facility]-   11/10/2022

## 2023-02-15 ENCOUNTER — OFFICE VISIT (OUTPATIENT)
Dept: PODIATRY | Facility: CLINIC | Age: 48
End: 2023-02-15
Payer: COMMERCIAL

## 2023-02-15 VITALS — WEIGHT: 315 LBS | HEIGHT: 75 IN | BODY MASS INDEX: 39.17 KG/M2

## 2023-02-15 DIAGNOSIS — E11.65 UNCONTROLLED TYPE 2 DIABETES MELLITUS WITH HYPERGLYCEMIA: Primary | ICD-10-CM

## 2023-02-15 DIAGNOSIS — L60.2 NAIL DISORDER (ONYCHOGRYPHOSIS): ICD-10-CM

## 2023-02-15 DIAGNOSIS — E11.42 DM TYPE 2 WITH DIABETIC PERIPHERAL NEUROPATHY: ICD-10-CM

## 2023-02-15 PROCEDURE — 99999 PR PBB SHADOW E&M-EST. PATIENT-LVL III: CPT | Mod: PBBFAC,,, | Performed by: PODIATRIST

## 2023-02-15 PROCEDURE — 99213 PR OFFICE/OUTPT VISIT, EST, LEVL III, 20-29 MIN: ICD-10-PCS | Mod: 25,S$GLB,, | Performed by: PODIATRIST

## 2023-02-15 PROCEDURE — 11721 PR DEBRIDEMENT OF NAILS, 6 OR MORE: ICD-10-PCS | Mod: S$GLB,,, | Performed by: PODIATRIST

## 2023-02-15 PROCEDURE — 3008F BODY MASS INDEX DOCD: CPT | Mod: CPTII,S$GLB,, | Performed by: PODIATRIST

## 2023-02-15 PROCEDURE — 11721 DEBRIDE NAIL 6 OR MORE: CPT | Mod: S$GLB,,, | Performed by: PODIATRIST

## 2023-02-15 PROCEDURE — 99999 PR PBB SHADOW E&M-EST. PATIENT-LVL III: ICD-10-PCS | Mod: PBBFAC,,, | Performed by: PODIATRIST

## 2023-02-15 PROCEDURE — 1159F MED LIST DOCD IN RCRD: CPT | Mod: CPTII,S$GLB,, | Performed by: PODIATRIST

## 2023-02-15 PROCEDURE — 99213 OFFICE O/P EST LOW 20 MIN: CPT | Mod: 25,S$GLB,, | Performed by: PODIATRIST

## 2023-02-15 PROCEDURE — 3008F PR BODY MASS INDEX (BMI) DOCUMENTED: ICD-10-PCS | Mod: CPTII,S$GLB,, | Performed by: PODIATRIST

## 2023-02-15 PROCEDURE — 1159F PR MEDICATION LIST DOCUMENTED IN MEDICAL RECORD: ICD-10-PCS | Mod: CPTII,S$GLB,, | Performed by: PODIATRIST

## 2023-02-15 RX ORDER — GABAPENTIN 300 MG/1
300 CAPSULE ORAL 2 TIMES DAILY
Qty: 30 CAPSULE | Refills: 3 | Status: SHIPPED | OUTPATIENT
Start: 2023-02-15

## 2023-02-15 NOTE — PROGRESS NOTES
PODIATRIC MEDICINE AND SURGERY      CHIEF COMPLAINT  Chief Complaint   Patient presents with    Routine Foot Care     Routine foot care, diabetic wears tennis shoes and socks, Last seen PCP Dr. Padilla 06/2022         HPI    SUBJECTIVE: Sebastian Ken is a 48 y.o. male who  has a past medical history of Diabetes mellitus, Diabetes mellitus, type 2, Hyperlipidemia, Hypertension, Lipodermatosclerosis, Neuropathy, and Sciatica. Sebastian presenting to podiatry clinic with complaint of thickened, discolored, and painful toenails. Pt states nails result in pain with enclosed shoe wear aggravated due to pressure  and/or with ambulation.  They are unable to trim nails. They are requesting nail care for relief of painful symptoms. He has been diagnosed with diabetic neuropathy. He is being managed by neurology. He does admit to numbness, tingling, and burning sensation to feet. He does have uncontrolled DM2.  Patient has no further pedal complaints.    Hemoglobin A1C   Date Value Ref Range Status   02/19/2011 10.1 (H) 4.0 - 6.2 % Final         PMH  Past Medical History:   Diagnosis Date    Diabetes mellitus     Diabetes mellitus, type 2     Hyperlipidemia     Hypertension     Lipodermatosclerosis     Neuropathy     Sciatica      There is no problem list on file for this patient.        MEDS  Current Outpatient Medications on File Prior to Visit   Medication Sig Dispense Refill    ciclopirox (PENLAC) 8 % Soln Apply to affected toenails at night time DAILY. On 7th day, file nails down, clean all nails with alcohol and restart application process. 1 Bottle 10    ciclopirox (PENLAC) 8 % Soln Apply to affected toenails at night time DAILY. On 7th day, file nails down, clean all nails with alcohol and restart application process. 1 Bottle 10    gabapentin (NEURONTIN) 300 MG capsule Take 300 mg by mouth 2 (two) times daily.      glimepiride (AMARYL) 2 MG tablet Take 2 mg by mouth before breakfast.      ibuprofen (ADVIL,MOTRIN)  800 MG tablet Take 800 mg by mouth every 8 (eight) hours as needed.      ketorolac (TORADOL) 10 mg tablet Take 10 mg by mouth every 8 (eight) hours as needed.      metFORMIN (GLUCOPHAGE) 1000 MG tablet Take 1,000 mg by mouth 2 (two) times daily with meals.      naproxen (NAPROSYN) 500 MG tablet Take 1 tablet (500 mg total) by mouth 2 (two) times daily with meals. 20 tablet 0    naproxen (NAPROSYN) 500 MG tablet Take 1 tablet (500 mg total) by mouth 2 (two) times daily with meals. 20 tablet 0    orphenadrine (NORFLEX) 100 mg tablet Take 100 mg by mouth 2 (two) times daily as needed.      pravastatin (PRAVACHOL) 20 MG tablet Take 20 mg by mouth once daily.      ramipriL (ALTACE) 10 MG capsule Take 10 mg by mouth once daily.      SOLIQUA 100/33 100 unit-33 mcg/mL InPn pen ADMINISTER 35 UNITS UNDER THE SKIN DAILY AS DIRECTED      TOUJEO SOLOSTAR U-300 INSULIN 300 unit/mL (1.5 mL) InPn pen INJECT 40 UNITS UNDER THE SKIN ONCE D      traMADoL (ULTRAM) 50 mg tablet Take 1 tablet (50 mg total) by mouth every 6 (six) hours as needed for Pain. 12 tablet 0    urea (UMECTA NAIL FILM PEN) 40 % NFSP Apply 1 application topically 2 (two) times daily. To affected toenail 3 g 10     No current facility-administered medications on file prior to visit.       PSH     Past Surgical History:   Procedure Laterality Date    CHOLECYSTECTOMY      KNEE SURGERY          ALL  Review of patient's allergies indicates:  No Known Allergies    SOC     Social History     Tobacco Use    Smoking status: Never    Smokeless tobacco: Never   Substance Use Topics    Alcohol use: No    Drug use: No         Family HX  History reviewed. No pertinent family history.       REVIEW OF SYSTEMS  General: This patient is well-developed, well-nourished and appears stated age, well-oriented to person, place and time, and cooperative and pleasant on today's visit  Constitutional: Negative for chills and fever.   Respiratory: Negative for shortness of breath.   "  Cardiovascular: Negative for chest pain, palpitations, orthopnea  Gastrointestinal: Negative for diarrhea, nausea and vomiting.   Musculoskeletal: Positive for above noted in HPI  Skin: Positive for skin and nail changes  Neurological: Positive for tingling and sensory changes  Peripheral Vascular: no claudication or cyanosis  Psychiatric/Behavioral: Negative for altered mental status       PHYSICAL EXAM  Vitals:    02/15/23 0802   Weight: (!) 163.3 kg (360 lb)   Height: 6' 3" (1.905 m)   PainSc: 0-No pain       GEN:  This patient is well-developed, well-nourished and appears stated age, well-oriented to person, place and time, and cooperative and pleasant on today's visit.      LOWER EXTREMITY    VASCULAR  DP pedal pulse 2/4 RIGHT, LEFT2/4   PT pedal pulse 2/4 RIGHT, LEFT2/4  Capillary refill time immediate to the toes.   Feet are warm to the touch. Skin temperature warm to warm from proximally to distally   There are varicosities, telangiectasias noted to bilateral foot and ankle regions.   There are no ecchymoses noted to bilateral foot and ankle regions.   There is gross lower extremity edema.  There are chronic venous stasis changes     DERMATOLOGIC  Skin moist with healthy texture and turgor.  Thickened, dystrophic, elongated, discolored toenails with subungal debris 1,2, 5 RIGHT FOOT, 1, 2, , 5 LEFT FOOT    There are no open ulcerations, lacerations, or fissures to bilateral foot and ankle regions. There are no signs of infection as there is no erythema, no proximal-extending lymphangiitis, no fluctuance, or crepitus noted on palpation to bilateral foot and ankle regions.   There is no interdigital maceration.   There are diffuse  hyperkeratotic lesions noted to feet.    NEUROLOGIC  Neurological sensation is grossly intact to all sites tested    ORTHOPEDIC/BIOMECHANICAL  No symptomatic structural abnormalities noted. Muscle strength is 5/5 for foot inverters, everters, plantarflexors, and dorsiflexors. " Muscle tone is normal.   Inspection/palpation of bone, joints and muscles unremarkable.    ASSESSMENT  Uncontrolled type 2 diabetes mellitus with hyperglycemia    DM type 2 with diabetic peripheral neuropathy    Nail disorder (onychogryphosis)        PLAN  -The patient was examined and evaulated. Patient was given verbal instructions on maintenance care for mycotic nails. The need for proper skin care in order to prevent infection and colonization in the nails was explained to the patient.      -With patient's permission, the elongated onychomycotic toenails, as outlined in the physical examination, were sharply debrided with a double action nail nipper to their soft tissue attachment. If indicated, the nails were then smoothed down in thickness with an devan board to facilitate in further debridement removing all offending nail and subungual debris. Patient relates relief following the procedure.     Discussed diabetic neuropathy and optimal glucose control is imperative. Patient states he is working on blood sugars and notes improvement.    Disclaimer: This note was partially prepared using a voice recognition system and is likely to have sound alike errors within the text.        Future Appointments   Date Time Provider Department Center   5/17/2023  8:00 AM Elissa Landis DPM [facility] [place]       Report Electronically Signed By:     Elissa Landis DPM   Podiatry  [facility]-   2/15/2023

## 2023-05-18 ENCOUNTER — OFFICE VISIT (OUTPATIENT)
Dept: PODIATRY | Facility: CLINIC | Age: 48
End: 2023-05-18
Payer: COMMERCIAL

## 2023-05-18 VITALS — HEIGHT: 75 IN | BODY MASS INDEX: 39.17 KG/M2 | WEIGHT: 315 LBS

## 2023-05-18 DIAGNOSIS — E11.65 UNCONTROLLED TYPE 2 DIABETES MELLITUS WITH HYPERGLYCEMIA: Primary | ICD-10-CM

## 2023-05-18 DIAGNOSIS — L60.2 NAIL DISORDER (ONYCHOGRYPHOSIS): ICD-10-CM

## 2023-05-18 DIAGNOSIS — E11.42 DM TYPE 2 WITH DIABETIC PERIPHERAL NEUROPATHY: ICD-10-CM

## 2023-05-18 PROCEDURE — 99999 PR PBB SHADOW E&M-EST. PATIENT-LVL IV: CPT | Mod: PBBFAC,,, | Performed by: PODIATRIST

## 2023-05-18 PROCEDURE — 1159F MED LIST DOCD IN RCRD: CPT | Mod: CPTII,S$GLB,, | Performed by: PODIATRIST

## 2023-05-18 PROCEDURE — 99999 PR PBB SHADOW E&M-EST. PATIENT-LVL IV: ICD-10-PCS | Mod: PBBFAC,,, | Performed by: PODIATRIST

## 2023-05-18 PROCEDURE — 3008F BODY MASS INDEX DOCD: CPT | Mod: CPTII,S$GLB,, | Performed by: PODIATRIST

## 2023-05-18 PROCEDURE — 99213 PR OFFICE/OUTPT VISIT, EST, LEVL III, 20-29 MIN: ICD-10-PCS | Mod: 25,S$GLB,, | Performed by: PODIATRIST

## 2023-05-18 PROCEDURE — 3008F PR BODY MASS INDEX (BMI) DOCUMENTED: ICD-10-PCS | Mod: CPTII,S$GLB,, | Performed by: PODIATRIST

## 2023-05-18 PROCEDURE — 11721 DEBRIDE NAIL 6 OR MORE: CPT | Mod: S$GLB,,, | Performed by: PODIATRIST

## 2023-05-18 PROCEDURE — 99213 OFFICE O/P EST LOW 20 MIN: CPT | Mod: 25,S$GLB,, | Performed by: PODIATRIST

## 2023-05-18 PROCEDURE — 1159F PR MEDICATION LIST DOCUMENTED IN MEDICAL RECORD: ICD-10-PCS | Mod: CPTII,S$GLB,, | Performed by: PODIATRIST

## 2023-05-18 PROCEDURE — 11721 PR DEBRIDEMENT OF NAILS, 6 OR MORE: ICD-10-PCS | Mod: S$GLB,,, | Performed by: PODIATRIST

## 2023-05-18 NOTE — PROGRESS NOTES
PODIATRIC MEDICINE AND SURGERY      CHIEF COMPLAINT  Chief Complaint   Patient presents with    Routine Foot Care     Routine foot care, diabetic wears casual shoes and socks, Patient is scheduled to see PCP Dr. Padilla next week         HPI    SUBJECTIVE: Sebastian Ken is a 48 y.o. male who  has a past medical history of Diabetes mellitus, Diabetes mellitus, type 2, Hyperlipidemia, Hypertension, Lipodermatosclerosis, Neuropathy, and Sciatica. Sebastian presenting to podiatry clinic with complaint of thickened, discolored, and painful toenails. Pt states nails result in pain with enclosed shoe wear aggravated due to pressure  and/or with ambulation.  They are unable to trim nails. They are requesting nail care for relief of painful symptoms. He has been diagnosed with diabetic neuropathy. He is being managed by neurology. He does admit to numbness, tingling, and burning sensation to feet. He does have uncontrolled DM2.  Patient has no further pedal complaints.    Hemoglobin A1C   Date Value Ref Range Status   02/19/2011 10.1 (H) 4.0 - 6.2 % Final         PMH  Past Medical History:   Diagnosis Date    Diabetes mellitus     Diabetes mellitus, type 2     Hyperlipidemia     Hypertension     Lipodermatosclerosis     Neuropathy     Sciatica      There is no problem list on file for this patient.        MEDS  Current Outpatient Medications on File Prior to Visit   Medication Sig Dispense Refill    ciclopirox (PENLAC) 8 % Soln Apply to affected toenails at night time DAILY. On 7th day, file nails down, clean all nails with alcohol and restart application process. 1 Bottle 10    ciclopirox (PENLAC) 8 % Soln Apply to affected toenails at night time DAILY. On 7th day, file nails down, clean all nails with alcohol and restart application process. 1 Bottle 10    gabapentin (NEURONTIN) 300 MG capsule Take 1 capsule (300 mg total) by mouth 2 (two) times daily. 30 capsule 3    glimepiride (AMARYL) 2 MG tablet Take 2 mg by mouth  before breakfast.      ibuprofen (ADVIL,MOTRIN) 800 MG tablet Take 800 mg by mouth every 8 (eight) hours as needed.      ketorolac (TORADOL) 10 mg tablet Take 10 mg by mouth every 8 (eight) hours as needed.      metFORMIN (GLUCOPHAGE) 1000 MG tablet Take 1,000 mg by mouth 2 (two) times daily with meals.      naproxen (NAPROSYN) 500 MG tablet Take 1 tablet (500 mg total) by mouth 2 (two) times daily with meals. 20 tablet 0    naproxen (NAPROSYN) 500 MG tablet Take 1 tablet (500 mg total) by mouth 2 (two) times daily with meals. 20 tablet 0    orphenadrine (NORFLEX) 100 mg tablet Take 100 mg by mouth 2 (two) times daily as needed.      pravastatin (PRAVACHOL) 20 MG tablet Take 20 mg by mouth once daily.      ramipriL (ALTACE) 10 MG capsule Take 10 mg by mouth once daily.      SOLIQUA 100/33 100 unit-33 mcg/mL InPn pen ADMINISTER 35 UNITS UNDER THE SKIN DAILY AS DIRECTED      TOUJEO SOLOSTAR U-300 INSULIN 300 unit/mL (1.5 mL) InPn pen INJECT 40 UNITS UNDER THE SKIN ONCE D      traMADoL (ULTRAM) 50 mg tablet Take 1 tablet (50 mg total) by mouth every 6 (six) hours as needed for Pain. 12 tablet 0    urea (UMECTA NAIL FILM PEN) 40 % NFSP Apply 1 application topically 2 (two) times daily. To affected toenail 3 g 10     No current facility-administered medications on file prior to visit.       PS     Past Surgical History:   Procedure Laterality Date    CHOLECYSTECTOMY      KNEE SURGERY          ALL  Review of patient's allergies indicates:  No Known Allergies    SOC     Social History     Tobacco Use    Smoking status: Never    Smokeless tobacco: Never   Substance Use Topics    Alcohol use: No    Drug use: No         Family HX  History reviewed. No pertinent family history.       REVIEW OF SYSTEMS  General: This patient is well-developed, well-nourished and appears stated age, well-oriented to person, place and time, and cooperative and pleasant on today's visit  Constitutional: Negative for chills and fever.  "  Respiratory: Negative for shortness of breath.    Cardiovascular: Negative for chest pain, palpitations, orthopnea  Gastrointestinal: Negative for diarrhea, nausea and vomiting.   Musculoskeletal: Positive for above noted in HPI  Skin: Positive for skin and nail changes  Neurological: Positive for tingling and sensory changes  Peripheral Vascular: no claudication or cyanosis  Psychiatric/Behavioral: Negative for altered mental status       PHYSICAL EXAM  Vitals:    05/18/23 0908   Weight: (!) 163.3 kg (360 lb)   Height: 6' 3" (1.905 m)   PainSc: 0-No pain       GEN:  This patient is well-developed, well-nourished and appears stated age, well-oriented to person, place and time, and cooperative and pleasant on today's visit.      LOWER EXTREMITY    VASCULAR  DP pedal pulse 2/4 RIGHT, LEFT2/4   PT pedal pulse 2/4 RIGHT, LEFT2/4  Capillary refill time immediate to the toes.   Feet are warm to the touch. Skin temperature warm to warm from proximally to distally   There are varicosities, telangiectasias noted to bilateral foot and ankle regions.   There are no ecchymoses noted to bilateral foot and ankle regions.   There is gross lower extremity edema.  There are chronic venous stasis changes     DERMATOLOGIC  Skin moist with healthy texture and turgor.  Thickened, dystrophic, elongated, discolored toenails with subungal debris 1,2, 5 RIGHT FOOT, 1, 2, , 5 LEFT FOOT    There are no open ulcerations, lacerations, or fissures to bilateral foot and ankle regions. There are no signs of infection as there is no erythema, no proximal-extending lymphangiitis, no fluctuance, or crepitus noted on palpation to bilateral foot and ankle regions.   There is no interdigital maceration.   There are diffuse  hyperkeratotic lesions noted to feet.    NEUROLOGIC  Neurological sensation is grossly intact to all sites tested    ORTHOPEDIC/BIOMECHANICAL  No symptomatic structural abnormalities noted. Muscle strength is 5/5 for foot " inverters, everters, plantarflexors, and dorsiflexors. Muscle tone is normal.   Inspection/palpation of bone, joints and muscles unremarkable.    ASSESSMENT  Uncontrolled type 2 diabetes mellitus with hyperglycemia    DM type 2 with diabetic peripheral neuropathy    Nail disorder (onychogryphosis)          PLAN  -The patient was examined and evaulated. Patient was given verbal instructions on maintenance care for mycotic nails. The need for proper skin care in order to prevent infection and colonization in the nails was explained to the patient.      -With patient's permission, the elongated onychomycotic toenails, as outlined in the physical examination, were sharply debrided with a double action nail nipper to their soft tissue attachment. If indicated, the nails were then smoothed down in thickness with an devan board to facilitate in further debridement removing all offending nail and subungual debris. Patient relates relief following the procedure.     Discussed diabetic neuropathy and optimal glucose control is imperative. Patient states he is working on blood sugars and notes improvement.    Disclaimer: This note was partially prepared using a voice recognition system and is likely to have sound alike errors within the text.        Future Appointments   Date Time Provider Department Center   8/24/2023  9:00 AM Elissa Landis DPM [facility] [place]       Report Electronically Signed By:     Elissa Landis DPM   Podiatry  [facility]-   5/18/2023

## 2023-08-24 ENCOUNTER — OFFICE VISIT (OUTPATIENT)
Dept: PODIATRY | Facility: CLINIC | Age: 48
End: 2023-08-24
Payer: COMMERCIAL

## 2023-08-24 VITALS — BODY MASS INDEX: 39.17 KG/M2 | WEIGHT: 315 LBS | HEIGHT: 75 IN

## 2023-08-24 DIAGNOSIS — E11.42 DM TYPE 2 WITH DIABETIC PERIPHERAL NEUROPATHY: ICD-10-CM

## 2023-08-24 DIAGNOSIS — L60.2 NAIL DISORDER (ONYCHOGRYPHOSIS): ICD-10-CM

## 2023-08-24 DIAGNOSIS — E11.65 UNCONTROLLED TYPE 2 DIABETES MELLITUS WITH HYPERGLYCEMIA: Primary | ICD-10-CM

## 2023-08-24 PROCEDURE — 1159F MED LIST DOCD IN RCRD: CPT | Mod: CPTII,S$GLB,, | Performed by: PODIATRIST

## 2023-08-24 PROCEDURE — 99213 OFFICE O/P EST LOW 20 MIN: CPT | Mod: 25,S$GLB,, | Performed by: PODIATRIST

## 2023-08-24 PROCEDURE — 99213 PR OFFICE/OUTPT VISIT, EST, LEVL III, 20-29 MIN: ICD-10-PCS | Mod: 25,S$GLB,, | Performed by: PODIATRIST

## 2023-08-24 PROCEDURE — 3008F PR BODY MASS INDEX (BMI) DOCUMENTED: ICD-10-PCS | Mod: CPTII,S$GLB,, | Performed by: PODIATRIST

## 2023-08-24 PROCEDURE — 11721 PR DEBRIDEMENT OF NAILS, 6 OR MORE: ICD-10-PCS | Mod: S$GLB,,, | Performed by: PODIATRIST

## 2023-08-24 PROCEDURE — 99999 PR PBB SHADOW E&M-EST. PATIENT-LVL IV: ICD-10-PCS | Mod: PBBFAC,,, | Performed by: PODIATRIST

## 2023-08-24 PROCEDURE — 3008F BODY MASS INDEX DOCD: CPT | Mod: CPTII,S$GLB,, | Performed by: PODIATRIST

## 2023-08-24 PROCEDURE — 11721 DEBRIDE NAIL 6 OR MORE: CPT | Mod: S$GLB,,, | Performed by: PODIATRIST

## 2023-08-24 PROCEDURE — 99999 PR PBB SHADOW E&M-EST. PATIENT-LVL IV: CPT | Mod: PBBFAC,,, | Performed by: PODIATRIST

## 2023-08-24 PROCEDURE — 1159F PR MEDICATION LIST DOCUMENTED IN MEDICAL RECORD: ICD-10-PCS | Mod: CPTII,S$GLB,, | Performed by: PODIATRIST

## 2023-08-24 RX ORDER — ROSUVASTATIN CALCIUM 10 MG/1
10 TABLET, COATED ORAL
COMMUNITY
Start: 2023-06-09

## 2023-08-24 RX ORDER — TIRZEPATIDE 2.5 MG/.5ML
INJECTION, SOLUTION SUBCUTANEOUS
COMMUNITY
Start: 2023-06-02

## 2023-08-24 NOTE — PROGRESS NOTES
PODIATRIC MEDICINE AND SURGERY      CHIEF COMPLAINT  Chief Complaint   Patient presents with    Routine Foot Care     3 month RF, diabetic, wears casual shoes, seeing Dr. Padilla on 08/31 for f/u         HPI    SUBJECTIVE: Sebastian Ken is a 48 y.o. male who  has a past medical history of Diabetes mellitus, Diabetes mellitus, type 2, Hyperlipidemia, Hypertension, Lipodermatosclerosis, Neuropathy, and Sciatica. Sebastian presenting to podiatry clinic with complaint of thickened, discolored, and painful toenails. Pt states nails result in pain with enclosed shoe wear aggravated due to pressure  and/or with ambulation.  They are unable to trim nails. They are requesting nail care for relief of painful symptoms. He has been diagnosed with diabetic neuropathy. He is being managed by neurology. He does admit to numbness, tingling, and burning sensation to feet. He does have uncontrolled DM2.  Patient has no further pedal complaints.    Hemoglobin A1C   Date Value Ref Range Status   02/19/2011 10.1 (H) 4.0 - 6.2 % Final         PMH  Past Medical History:   Diagnosis Date    Diabetes mellitus     Diabetes mellitus, type 2     Hyperlipidemia     Hypertension     Lipodermatosclerosis     Neuropathy     Sciatica      There is no problem list on file for this patient.        MEDS  Current Outpatient Medications on File Prior to Visit   Medication Sig Dispense Refill    ciclopirox (PENLAC) 8 % Soln Apply to affected toenails at night time DAILY. On 7th day, file nails down, clean all nails with alcohol and restart application process. 1 Bottle 10    ciclopirox (PENLAC) 8 % Soln Apply to affected toenails at night time DAILY. On 7th day, file nails down, clean all nails with alcohol and restart application process. 1 Bottle 10    gabapentin (NEURONTIN) 300 MG capsule Take 1 capsule (300 mg total) by mouth 2 (two) times daily. 30 capsule 3    glimepiride (AMARYL) 2 MG tablet Take 2 mg by mouth before breakfast.      ibuprofen  (ADVIL,MOTRIN) 800 MG tablet Take 800 mg by mouth every 8 (eight) hours as needed.      ketorolac (TORADOL) 10 mg tablet Take 10 mg by mouth every 8 (eight) hours as needed.      metFORMIN (GLUCOPHAGE) 1000 MG tablet Take 1,000 mg by mouth 2 (two) times daily with meals.      MOUNJARO 2.5 mg/0.5 mL PnIj SMARTSI.5 Milligram(s) SUB-Q Once a Week      naproxen (NAPROSYN) 500 MG tablet Take 1 tablet (500 mg total) by mouth 2 (two) times daily with meals. 20 tablet 0    naproxen (NAPROSYN) 500 MG tablet Take 1 tablet (500 mg total) by mouth 2 (two) times daily with meals. 20 tablet 0    orphenadrine (NORFLEX) 100 mg tablet Take 100 mg by mouth 2 (two) times daily as needed.      pravastatin (PRAVACHOL) 20 MG tablet Take 20 mg by mouth once daily.      ramipriL (ALTACE) 10 MG capsule Take 10 mg by mouth once daily.      rosuvastatin (CRESTOR) 10 MG tablet Take 10 mg by mouth.      SOLIQUA 100/33 100 unit-33 mcg/mL InPn pen ADMINISTER 35 UNITS UNDER THE SKIN DAILY AS DIRECTED      TOUJEO SOLOSTAR U-300 INSULIN 300 unit/mL (1.5 mL) InPn pen INJECT 40 UNITS UNDER THE SKIN ONCE D      traMADoL (ULTRAM) 50 mg tablet Take 1 tablet (50 mg total) by mouth every 6 (six) hours as needed for Pain. 12 tablet 0    urea (UMECTA NAIL FILM PEN) 40 % NFSP Apply 1 application topically 2 (two) times daily. To affected toenail 3 g 10     No current facility-administered medications on file prior to visit.       PS     Past Surgical History:   Procedure Laterality Date    CHOLECYSTECTOMY      KNEE SURGERY          ALL  Review of patient's allergies indicates:  No Known Allergies    SOC     Social History     Tobacco Use    Smoking status: Never    Smokeless tobacco: Never   Substance Use Topics    Alcohol use: No    Drug use: No         Family HX  History reviewed. No pertinent family history.       REVIEW OF SYSTEMS  General: This patient is well-developed, well-nourished and appears stated age, well-oriented to person, place and time,  "and cooperative and pleasant on today's visit  Constitutional: Negative for chills and fever.   Respiratory: Negative for shortness of breath.    Cardiovascular: Negative for chest pain, palpitations, orthopnea  Gastrointestinal: Negative for diarrhea, nausea and vomiting.   Musculoskeletal: Positive for above noted in HPI  Skin: Positive for skin and nail changes  Neurological: Positive for tingling and sensory changes  Peripheral Vascular: no claudication or cyanosis  Psychiatric/Behavioral: Negative for altered mental status       PHYSICAL EXAM  Vitals:    08/24/23 0905   Weight: (!) 163.3 kg (360 lb)   Height: 6' 3" (1.905 m)   PainSc: 0-No pain       GEN:  This patient is well-developed, well-nourished and appears stated age, well-oriented to person, place and time, and cooperative and pleasant on today's visit.      LOWER EXTREMITY    VASCULAR  DP pedal pulse 2/4 RIGHT, LEFT2/4   PT pedal pulse 2/4 RIGHT, LEFT2/4  Capillary refill time immediate to the toes.   Feet are warm to the touch. Skin temperature warm to warm from proximally to distally   There are varicosities, telangiectasias noted to bilateral foot and ankle regions.   There are no ecchymoses noted to bilateral foot and ankle regions.   There is gross lower extremity edema.  There are chronic venous stasis changes     DERMATOLOGIC  Skin moist with healthy texture and turgor.  Thickened, dystrophic, elongated, discolored toenails with subungal debris 1,2, 5 RIGHT FOOT, 1, 2, , 5 LEFT FOOT    There are no open ulcerations, lacerations, or fissures to bilateral foot and ankle regions. There are no signs of infection as there is no erythema, no proximal-extending lymphangiitis, no fluctuance, or crepitus noted on palpation to bilateral foot and ankle regions.   There is no interdigital maceration.   There are diffuse  hyperkeratotic lesions noted to feet.    NEUROLOGIC  Neurological sensation is grossly intact to all sites " tested    ORTHOPEDIC/BIOMECHANICAL  No symptomatic structural abnormalities noted. Muscle strength is 5/5 for foot inverters, everters, plantarflexors, and dorsiflexors. Muscle tone is normal.   Inspection/palpation of bone, joints and muscles unremarkable.    ASSESSMENT  Uncontrolled type 2 diabetes mellitus with hyperglycemia    DM type 2 with diabetic peripheral neuropathy    Nail disorder (onychogryphosis)            PLAN  -The patient was examined and evaulated. Patient was given verbal instructions on maintenance care for mycotic nails. The need for proper skin care in order to prevent infection and colonization in the nails was explained to the patient.      -With patient's permission, the elongated onychomycotic toenails, as outlined in the physical examination, were sharply debrided with a double action nail nipper to their soft tissue attachment. If indicated, the nails were then smoothed down in thickness with an devan board to facilitate in further debridement removing all offending nail and subungual debris. Patient relates relief following the procedure.     Discussed diabetic neuropathy and optimal glucose control is imperative. Patient states he is working on blood sugars and notes improvement.    Disclaimer: This note was partially prepared using a voice recognition system and is likely to have sound alike errors within the text.        Future Appointments   Date Time Provider Department Center   11/30/2023  9:00 AM Elissa Landis DPM [facility] POD [place]       Report Electronically Signed By:     Elissa Landis DPM   Podiatry  [facility]-   8/24/2023

## 2023-11-30 ENCOUNTER — OFFICE VISIT (OUTPATIENT)
Dept: PODIATRY | Facility: CLINIC | Age: 48
End: 2023-11-30
Payer: COMMERCIAL

## 2023-11-30 VITALS — BODY MASS INDEX: 39.17 KG/M2 | WEIGHT: 315 LBS | HEIGHT: 75 IN

## 2023-11-30 DIAGNOSIS — E11.42 DM TYPE 2 WITH DIABETIC PERIPHERAL NEUROPATHY: Primary | ICD-10-CM

## 2023-11-30 DIAGNOSIS — L60.2 NAIL DISORDER (ONYCHOGRYPHOSIS): ICD-10-CM

## 2023-11-30 PROCEDURE — 3008F BODY MASS INDEX DOCD: CPT | Mod: CPTII,S$GLB,, | Performed by: PODIATRIST

## 2023-11-30 PROCEDURE — 99999 PR PBB SHADOW E&M-EST. PATIENT-LVL II: CPT | Mod: PBBFAC,,, | Performed by: PODIATRIST

## 2023-11-30 PROCEDURE — 3008F PR BODY MASS INDEX (BMI) DOCUMENTED: ICD-10-PCS | Mod: CPTII,S$GLB,, | Performed by: PODIATRIST

## 2023-11-30 PROCEDURE — 99213 OFFICE O/P EST LOW 20 MIN: CPT | Mod: 25,S$GLB,, | Performed by: PODIATRIST

## 2023-11-30 PROCEDURE — 99999 PR PBB SHADOW E&M-EST. PATIENT-LVL II: ICD-10-PCS | Mod: PBBFAC,,, | Performed by: PODIATRIST

## 2023-11-30 PROCEDURE — 99213 PR OFFICE/OUTPT VISIT, EST, LEVL III, 20-29 MIN: ICD-10-PCS | Mod: 25,S$GLB,, | Performed by: PODIATRIST

## 2023-11-30 PROCEDURE — 11721 DEBRIDE NAIL 6 OR MORE: CPT | Mod: S$GLB,,, | Performed by: PODIATRIST

## 2023-11-30 PROCEDURE — 11721 PR DEBRIDEMENT OF NAILS, 6 OR MORE: ICD-10-PCS | Mod: S$GLB,,, | Performed by: PODIATRIST

## 2023-11-30 RX ORDER — TIRZEPATIDE 7.5 MG/.5ML
INJECTION, SOLUTION SUBCUTANEOUS
COMMUNITY
Start: 2023-11-29

## 2023-11-30 RX ORDER — FLUTICASONE PROPIONATE 50 MCG
SPRAY, SUSPENSION (ML) NASAL
COMMUNITY
Start: 2023-10-03

## 2023-11-30 RX ORDER — MONTELUKAST SODIUM 10 MG/1
10 TABLET ORAL
COMMUNITY
Start: 2023-10-03

## 2023-11-30 RX ORDER — PROPRANOLOL HYDROCHLORIDE 10 MG/1
10 TABLET ORAL
COMMUNITY
Start: 2023-10-09

## 2023-11-30 RX ORDER — ALIROCUMAB 75 MG/ML
INJECTION, SOLUTION SUBCUTANEOUS
COMMUNITY
Start: 2023-08-09

## 2023-11-30 RX ORDER — METHYLPREDNISOLONE 4 MG/1
TABLET ORAL
COMMUNITY
Start: 2023-10-03

## 2023-11-30 NOTE — PROGRESS NOTES
PODIATRIC MEDICINE AND SURGERY      CHIEF COMPLAINT  Chief Complaint   Patient presents with    Routine Foot Care     3 month RF, diabetic, wears casual shoes, seeing Dr. Padilla on 08/31 for f/u         HPI    SUBJECTIVE: Sebastian Ken is a 48 y.o. male who  has a past medical history of Diabetes mellitus, Diabetes mellitus, type 2, Hyperlipidemia, Hypertension, Lipodermatosclerosis, Neuropathy, and Sciatica. Sebastian presenting to podiatry clinic with complaint of thickened, discolored, and painful toenails. Pt states nails result in pain with enclosed shoe wear aggravated due to pressure  and/or with ambulation.  They are unable to trim nails. They are requesting nail care for relief of painful symptoms. He has been diagnosed with diabetic neuropathy. He is being managed by neurology. He does admit to numbness, tingling, and burning sensation to feet. He does have uncontrolled DM2.  Patient has no further pedal complaints.    Hemoglobin A1C   Date Value Ref Range Status   02/19/2011 10.1 (H) 4.0 - 6.2 % Final         PMH  Past Medical History:   Diagnosis Date    Diabetes mellitus     Diabetes mellitus, type 2     Hyperlipidemia     Hypertension     Lipodermatosclerosis     Neuropathy     Sciatica      There is no problem list on file for this patient.        MEDS  Current Outpatient Medications on File Prior to Visit   Medication Sig Dispense Refill    ciclopirox (PENLAC) 8 % Soln Apply to affected toenails at night time DAILY. On 7th day, file nails down, clean all nails with alcohol and restart application process. 1 Bottle 10    ciclopirox (PENLAC) 8 % Soln Apply to affected toenails at night time DAILY. On 7th day, file nails down, clean all nails with alcohol and restart application process. 1 Bottle 10    gabapentin (NEURONTIN) 300 MG capsule Take 1 capsule (300 mg total) by mouth 2 (two) times daily. 30 capsule 3    glimepiride (AMARYL) 2 MG tablet Take 2 mg by mouth before breakfast.      ibuprofen  (ADVIL,MOTRIN) 800 MG tablet Take 800 mg by mouth every 8 (eight) hours as needed.      ketorolac (TORADOL) 10 mg tablet Take 10 mg by mouth every 8 (eight) hours as needed.      metFORMIN (GLUCOPHAGE) 1000 MG tablet Take 1,000 mg by mouth 2 (two) times daily with meals.      MOUNJARO 2.5 mg/0.5 mL PnIj SMARTSI.5 Milligram(s) SUB-Q Once a Week      naproxen (NAPROSYN) 500 MG tablet Take 1 tablet (500 mg total) by mouth 2 (two) times daily with meals. 20 tablet 0    naproxen (NAPROSYN) 500 MG tablet Take 1 tablet (500 mg total) by mouth 2 (two) times daily with meals. 20 tablet 0    orphenadrine (NORFLEX) 100 mg tablet Take 100 mg by mouth 2 (two) times daily as needed.      pravastatin (PRAVACHOL) 20 MG tablet Take 20 mg by mouth once daily.      ramipriL (ALTACE) 10 MG capsule Take 10 mg by mouth once daily.      rosuvastatin (CRESTOR) 10 MG tablet Take 10 mg by mouth.      SOLIQUA 100/33 100 unit-33 mcg/mL InPn pen ADMINISTER 35 UNITS UNDER THE SKIN DAILY AS DIRECTED      TOUJEO SOLOSTAR U-300 INSULIN 300 unit/mL (1.5 mL) InPn pen INJECT 40 UNITS UNDER THE SKIN ONCE D      traMADoL (ULTRAM) 50 mg tablet Take 1 tablet (50 mg total) by mouth every 6 (six) hours as needed for Pain. 12 tablet 0    urea (UMECTA NAIL FILM PEN) 40 % NFSP Apply 1 application topically 2 (two) times daily. To affected toenail 3 g 10     No current facility-administered medications on file prior to visit.       PS     Past Surgical History:   Procedure Laterality Date    CHOLECYSTECTOMY      KNEE SURGERY          ALL  Review of patient's allergies indicates:  No Known Allergies    SOC     Social History     Tobacco Use    Smoking status: Never    Smokeless tobacco: Never   Substance Use Topics    Alcohol use: No    Drug use: No         Family HX  History reviewed. No pertinent family history.       REVIEW OF SYSTEMS  General: This patient is well-developed, well-nourished and appears stated age, well-oriented to person, place and time,  "and cooperative and pleasant on today's visit  Constitutional: Negative for chills and fever.   Respiratory: Negative for shortness of breath.    Cardiovascular: Negative for chest pain, palpitations, orthopnea  Gastrointestinal: Negative for diarrhea, nausea and vomiting.   Musculoskeletal: Positive for above noted in HPI  Skin: Positive for skin and nail changes  Neurological: Positive for tingling and sensory changes  Peripheral Vascular: no claudication or cyanosis  Psychiatric/Behavioral: Negative for altered mental status       PHYSICAL EXAM  Vitals:    08/24/23 0905   Weight: (!) 163.3 kg (360 lb)   Height: 6' 3" (1.905 m)   PainSc: 0-No pain       GEN:  This patient is well-developed, well-nourished and appears stated age, well-oriented to person, place and time, and cooperative and pleasant on today's visit.      LOWER EXTREMITY    VASCULAR  DP pedal pulse 2/4 RIGHT, LEFT2/4   PT pedal pulse 2/4 RIGHT, LEFT2/4  Capillary refill time immediate to the toes.   Feet are warm to the touch. Skin temperature warm to warm from proximally to distally   There are varicosities, telangiectasias noted to bilateral foot and ankle regions.   There are no ecchymoses noted to bilateral foot and ankle regions.   There is gross lower extremity edema.  There are chronic venous stasis changes     DERMATOLOGIC  Skin moist with healthy texture and turgor.  Thickened, dystrophic, elongated, discolored toenails with subungal debris 1,2, 5 RIGHT FOOT, 1, 2, , 5 LEFT FOOT    There are no open ulcerations, lacerations, or fissures to bilateral foot and ankle regions. There are no signs of infection as there is no erythema, no proximal-extending lymphangiitis, no fluctuance, or crepitus noted on palpation to bilateral foot and ankle regions.   There is no interdigital maceration.   There are diffuse  hyperkeratotic lesions noted to feet.    NEUROLOGIC  Neurological sensation is grossly intact to all sites " tested    ORTHOPEDIC/BIOMECHANICAL  No symptomatic structural abnormalities noted. Muscle strength is 5/5 for foot inverters, everters, plantarflexors, and dorsiflexors. Muscle tone is normal.   Inspection/palpation of bone, joints and muscles unremarkable.    ASSESSMENT  Uncontrolled type 2 diabetes mellitus with hyperglycemia    DM type 2 with diabetic peripheral neuropathy    Nail disorder (onychogryphosis)      PLAN  -The patient was examined and evaulated. Patient was given verbal instructions on maintenance care for mycotic nails. The need for proper skin care in order to prevent infection and colonization in the nails was explained to the patient.      -With patient's permission, the elongated onychomycotic toenails, as outlined in the physical examination, were sharply debrided with a double action nail nipper to their soft tissue attachment. If indicated, the nails were then smoothed down in thickness with an devan board to facilitate in further debridement removing all offending nail and subungual debris. Patient relates relief following the procedure.     Discussed diabetic neuropathy and optimal glucose control is imperative. Patient states he is working on blood sugars and notes improvement.    Disclaimer: This note was partially prepared using a voice recognition system and is likely to have sound alike errors within the text.        Future Appointments   Date Time Provider Department Center   11/30/2023  9:00 AM Elissa Landis DPM [facility] POD [place]       Report Electronically Signed By:     Elissa Landis DPM   Podiatry  [facility]-   8/24/2023

## 2024-02-29 ENCOUNTER — OFFICE VISIT (OUTPATIENT)
Dept: PODIATRY | Facility: CLINIC | Age: 49
End: 2024-02-29
Payer: COMMERCIAL

## 2024-02-29 VITALS — WEIGHT: 315 LBS | HEIGHT: 75 IN | BODY MASS INDEX: 39.17 KG/M2

## 2024-02-29 DIAGNOSIS — E11.65 UNCONTROLLED TYPE 2 DIABETES MELLITUS WITH HYPERGLYCEMIA: ICD-10-CM

## 2024-02-29 DIAGNOSIS — L60.2 NAIL DISORDER (ONYCHOGRYPHOSIS): ICD-10-CM

## 2024-02-29 DIAGNOSIS — E11.42 DM TYPE 2 WITH DIABETIC PERIPHERAL NEUROPATHY: ICD-10-CM

## 2024-02-29 DIAGNOSIS — E11.9 COMPREHENSIVE DIABETIC FOOT EXAMINATION, TYPE 2 DM, ENCOUNTER FOR: Primary | ICD-10-CM

## 2024-02-29 PROCEDURE — 99213 OFFICE O/P EST LOW 20 MIN: CPT | Mod: 25,S$GLB,, | Performed by: PODIATRIST

## 2024-02-29 PROCEDURE — 1159F MED LIST DOCD IN RCRD: CPT | Mod: CPTII,S$GLB,, | Performed by: PODIATRIST

## 2024-02-29 PROCEDURE — 99999 PR PBB SHADOW E&M-EST. PATIENT-LVL IV: CPT | Mod: PBBFAC,,, | Performed by: PODIATRIST

## 2024-02-29 PROCEDURE — 3008F BODY MASS INDEX DOCD: CPT | Mod: CPTII,S$GLB,, | Performed by: PODIATRIST

## 2024-02-29 PROCEDURE — 11721 DEBRIDE NAIL 6 OR MORE: CPT | Mod: S$GLB,,, | Performed by: PODIATRIST

## 2024-02-29 RX ORDER — EVOLOCUMAB 140 MG/ML
INJECTION, SOLUTION SUBCUTANEOUS
COMMUNITY

## 2024-02-29 NOTE — PROGRESS NOTES
PODIATRIC MEDICINE AND SURGERY      CHIEF COMPLAINT  Chief Complaint   Patient presents with    Routine Foot Care     3 month RF, diabetic, wears casual shoes, seeing Dr. Padilla on 08/31 for f/u         HPI    SUBJECTIVE: Sebastian Ken is a 48 y.o. male who  has a past medical history of Diabetes mellitus, Diabetes mellitus, type 2, Hyperlipidemia, Hypertension, Lipodermatosclerosis, Neuropathy, and Sciatica. Sebastian presenting to podiatry clinic with complaint of thickened, discolored, and painful toenails. Pt states nails result in pain with enclosed shoe wear aggravated due to pressure  and/or with ambulation.  They are unable to trim nails. They are requesting nail care for relief of painful symptoms. He has been diagnosed with diabetic neuropathy. He is being managed by neurology. He does admit to numbness, tingling, and burning sensation to feet. He does have uncontrolled DM2.  Patient has no further pedal complaints.    Hemoglobin A1C   Date Value Ref Range Status   02/19/2011 10.1 (H) 4.0 - 6.2 % Final         PMH  Past Medical History:   Diagnosis Date    Diabetes mellitus     Diabetes mellitus, type 2     Hyperlipidemia     Hypertension     Lipodermatosclerosis     Neuropathy     Sciatica      There is no problem list on file for this patient.        MEDS  Current Outpatient Medications on File Prior to Visit   Medication Sig Dispense Refill    ciclopirox (PENLAC) 8 % Soln Apply to affected toenails at night time DAILY. On 7th day, file nails down, clean all nails with alcohol and restart application process. 1 Bottle 10    ciclopirox (PENLAC) 8 % Soln Apply to affected toenails at night time DAILY. On 7th day, file nails down, clean all nails with alcohol and restart application process. 1 Bottle 10    gabapentin (NEURONTIN) 300 MG capsule Take 1 capsule (300 mg total) by mouth 2 (two) times daily. 30 capsule 3    glimepiride (AMARYL) 2 MG tablet Take 2 mg by mouth before breakfast.      ibuprofen  (ADVIL,MOTRIN) 800 MG tablet Take 800 mg by mouth every 8 (eight) hours as needed.      ketorolac (TORADOL) 10 mg tablet Take 10 mg by mouth every 8 (eight) hours as needed.      metFORMIN (GLUCOPHAGE) 1000 MG tablet Take 1,000 mg by mouth 2 (two) times daily with meals.      MOUNJARO 2.5 mg/0.5 mL PnIj SMARTSI.5 Milligram(s) SUB-Q Once a Week      naproxen (NAPROSYN) 500 MG tablet Take 1 tablet (500 mg total) by mouth 2 (two) times daily with meals. 20 tablet 0    naproxen (NAPROSYN) 500 MG tablet Take 1 tablet (500 mg total) by mouth 2 (two) times daily with meals. 20 tablet 0    orphenadrine (NORFLEX) 100 mg tablet Take 100 mg by mouth 2 (two) times daily as needed.      pravastatin (PRAVACHOL) 20 MG tablet Take 20 mg by mouth once daily.      ramipriL (ALTACE) 10 MG capsule Take 10 mg by mouth once daily.      rosuvastatin (CRESTOR) 10 MG tablet Take 10 mg by mouth.      SOLIQUA 100/33 100 unit-33 mcg/mL InPn pen ADMINISTER 35 UNITS UNDER THE SKIN DAILY AS DIRECTED      TOUJEO SOLOSTAR U-300 INSULIN 300 unit/mL (1.5 mL) InPn pen INJECT 40 UNITS UNDER THE SKIN ONCE D      traMADoL (ULTRAM) 50 mg tablet Take 1 tablet (50 mg total) by mouth every 6 (six) hours as needed for Pain. 12 tablet 0    urea (UMECTA NAIL FILM PEN) 40 % NFSP Apply 1 application topically 2 (two) times daily. To affected toenail 3 g 10     No current facility-administered medications on file prior to visit.       PS     Past Surgical History:   Procedure Laterality Date    CHOLECYSTECTOMY      KNEE SURGERY          ALL  Review of patient's allergies indicates:  No Known Allergies    SOC     Social History     Tobacco Use    Smoking status: Never    Smokeless tobacco: Never   Substance Use Topics    Alcohol use: No    Drug use: No         Family HX  History reviewed. No pertinent family history.       REVIEW OF SYSTEMS  General: This patient is well-developed, well-nourished and appears stated age, well-oriented to person, place and time,  "and cooperative and pleasant on today's visit  Constitutional: Negative for chills and fever.   Respiratory: Negative for shortness of breath.    Cardiovascular: Negative for chest pain, palpitations, orthopnea  Gastrointestinal: Negative for diarrhea, nausea and vomiting.   Musculoskeletal: Positive for above noted in HPI  Skin: Positive for skin and nail changes  Neurological: Positive for tingling and sensory changes  Peripheral Vascular: no claudication or cyanosis  Psychiatric/Behavioral: Negative for altered mental status       PHYSICAL EXAM  Vitals:    08/24/23 0905   Weight: (!) 163.3 kg (360 lb)   Height: 6' 3" (1.905 m)   PainSc: 0-No pain       GEN:  This patient is well-developed, well-nourished and appears stated age, well-oriented to person, place and time, and cooperative and pleasant on today's visit.      LOWER EXTREMITY    VASCULAR  DP pedal pulse 2/4 RIGHT, LEFT2/4   PT pedal pulse 2/4 RIGHT, LEFT2/4  Capillary refill time immediate to the toes.   Feet are warm to the touch. Skin temperature warm to warm from proximally to distally   There are varicosities, telangiectasias noted to bilateral foot and ankle regions.   There are no ecchymoses noted to bilateral foot and ankle regions.   There is gross lower extremity edema.  There are chronic venous stasis changes     DERMATOLOGIC  Skin moist with healthy texture and turgor.  Thickened, dystrophic, elongated, discolored toenails with subungal debris 1,2, 5 RIGHT FOOT, 1, 2, , 5 LEFT FOOT    There are no open ulcerations, lacerations, or fissures to bilateral foot and ankle regions. There are no signs of infection as there is no erythema, no proximal-extending lymphangiitis, no fluctuance, or crepitus noted on palpation to bilateral foot and ankle regions.   There is no interdigital maceration.   There are diffuse  hyperkeratotic lesions noted to feet.    NEUROLOGIC  Neurological sensation is grossly intact to all sites " tested    ORTHOPEDIC/BIOMECHANICAL  No symptomatic structural abnormalities noted. Muscle strength is 5/5 for foot inverters, everters, plantarflexors, and dorsiflexors. Muscle tone is normal.   Inspection/palpation of bone, joints and muscles unremarkable.    ASSESSMENT  Uncontrolled type 2 diabetes mellitus with hyperglycemia    DM type 2 with diabetic peripheral neuropathy    Nail disorder (onychogryphosis)      PLAN  -The patient was examined and evaulated. Patient was given verbal instructions on maintenance care for mycotic nails. The need for proper skin care in order to prevent infection and colonization in the nails was explained to the patient.      -With patient's permission, the elongated onychomycotic toenails, as outlined in the physical examination, were sharply debrided with a double action nail nipper to their soft tissue attachment. If indicated, the nails were then smoothed down in thickness with an devan board to facilitate in further debridement removing all offending nail and subungual debris. Patient relates relief following the procedure.     Discussed diabetic neuropathy and optimal glucose control is imperative. Patient states he is working on blood sugars and notes improvement.    Disclaimer: This note was partially prepared using a voice recognition system and is likely to have sound alike errors within the text.        Future Appointments   Date Time Provider Department Center   11/30/2023  9:00 AM Elissa Landis DPM [facility] POD [place]       Report Electronically Signed By:     Elissa Landis DPM   Podiatry  [facility]-   8/24/2023

## 2024-05-30 ENCOUNTER — OFFICE VISIT (OUTPATIENT)
Dept: PODIATRY | Facility: CLINIC | Age: 49
End: 2024-05-30
Payer: COMMERCIAL

## 2024-05-30 VITALS — BODY MASS INDEX: 39.17 KG/M2 | HEIGHT: 75 IN | WEIGHT: 315 LBS

## 2024-05-30 DIAGNOSIS — E11.42 DM TYPE 2 WITH DIABETIC PERIPHERAL NEUROPATHY: Primary | ICD-10-CM

## 2024-05-30 DIAGNOSIS — L60.2 NAIL DISORDER (ONYCHOGRYPHOSIS): ICD-10-CM

## 2024-05-30 DIAGNOSIS — E11.65 UNCONTROLLED TYPE 2 DIABETES MELLITUS WITH HYPERGLYCEMIA: ICD-10-CM

## 2024-05-30 PROCEDURE — 3008F BODY MASS INDEX DOCD: CPT | Mod: CPTII,S$GLB,, | Performed by: PODIATRIST

## 2024-05-30 PROCEDURE — 99999 PR PBB SHADOW E&M-EST. PATIENT-LVL IV: CPT | Mod: PBBFAC,,, | Performed by: PODIATRIST

## 2024-05-30 PROCEDURE — 99213 OFFICE O/P EST LOW 20 MIN: CPT | Mod: 25,S$GLB,, | Performed by: PODIATRIST

## 2024-05-30 PROCEDURE — 11721 DEBRIDE NAIL 6 OR MORE: CPT | Mod: S$GLB,,, | Performed by: PODIATRIST

## 2024-05-30 PROCEDURE — 1159F MED LIST DOCD IN RCRD: CPT | Mod: CPTII,S$GLB,, | Performed by: PODIATRIST

## 2024-05-30 NOTE — PROGRESS NOTES
PODIATRIC MEDICINE AND SURGERY      CHIEF COMPLAINT  Chief Complaint   Patient presents with    Routine Foot Care     Patient denies pain at present. Patient is diabetic. Patient is wearing tennis shoes.          HPI    SUBJECTIVE: Sebastian Ken is a 49 y.o. male who  has a past medical history of Diabetes mellitus, Diabetes mellitus, type 2, Hyperlipidemia, Hypertension, Lipodermatosclerosis, Neuropathy, and Sciatica. Sebastian presenting to podiatry clinic with complaint of thickened, discolored, and painful toenails. Pt states nails result in pain with enclosed shoe wear aggravated due to pressure  and/or with ambulation.  They are unable to trim nails. They are requesting nail care for relief of painful symptoms. He has been diagnosed with diabetic neuropathy. He is being managed by neurology. He does admit to numbness, tingling, and burning sensation to feet. He does have uncontrolled DM2.  Patient has no further pedal complaints.    Hemoglobin A1C   Date Value Ref Range Status   02/19/2011 10.1 (H) 4.0 - 6.2 % Final         PMH  Past Medical History:   Diagnosis Date    Diabetes mellitus     Diabetes mellitus, type 2     Hyperlipidemia     Hypertension     Lipodermatosclerosis     Neuropathy     Sciatica      There is no problem list on file for this patient.        MEDS  Current Outpatient Medications on File Prior to Visit   Medication Sig Dispense Refill    ciclopirox (PENLAC) 8 % Soln Apply to affected toenails at night time DAILY. On 7th day, file nails down, clean all nails with alcohol and restart application process. 1 Bottle 10    ciclopirox (PENLAC) 8 % Soln Apply to affected toenails at night time DAILY. On 7th day, file nails down, clean all nails with alcohol and restart application process. 1 Bottle 10    evolocumab (REPATHA SURECLICK) 140 mg/mL PnIj Inject 140 mg Subcutaneous every 2 weeks for 30 days      fluticasone propionate (FLONASE) 50 mcg/actuation nasal spray by Each Nostril  route.      gabapentin (NEURONTIN) 300 MG capsule Take 1 capsule (300 mg total) by mouth 2 (two) times daily. 30 capsule 3    glimepiride (AMARYL) 2 MG tablet Take 2 mg by mouth before breakfast.      ibuprofen (ADVIL,MOTRIN) 800 MG tablet Take 800 mg by mouth every 8 (eight) hours as needed.      ketorolac (TORADOL) 10 mg tablet Take 10 mg by mouth every 8 (eight) hours as needed.      metFORMIN (GLUCOPHAGE) 1000 MG tablet Take 1,000 mg by mouth 2 (two) times daily with meals.      methylPREDNISolone (MEDROL DOSEPACK) 4 mg tablet FOLLOW PACKAGE DIRECTIONS      montelukast (SINGULAIR) 10 mg tablet Take 10 mg by mouth.      MOUNJARO 2.5 mg/0.5 mL PnIj SMARTSI.5 Milligram(s) SUB-Q Once a Week      MOUNJARO 7.5 mg/0.5 mL PnIj Inject into the skin.      naproxen (NAPROSYN) 500 MG tablet Take 1 tablet (500 mg total) by mouth 2 (two) times daily with meals. 20 tablet 0    naproxen (NAPROSYN) 500 MG tablet Take 1 tablet (500 mg total) by mouth 2 (two) times daily with meals. 20 tablet 0    orphenadrine (NORFLEX) 100 mg tablet Take 100 mg by mouth 2 (two) times daily as needed.      PRALUENT PEN 75 mg/mL PnIj SMARTSI Milligram(s) SUB-Q Every 2 Weeks      pravastatin (PRAVACHOL) 20 MG tablet Take 20 mg by mouth once daily.      propranoloL (INDERAL) 10 MG tablet Take 10 mg by mouth.      ramipriL (ALTACE) 10 MG capsule Take 10 mg by mouth once daily.      rosuvastatin (CRESTOR) 10 MG tablet Take 10 mg by mouth.      SOLIQUA 100/33 100 unit-33 mcg/mL InPn pen ADMINISTER 35 UNITS UNDER THE SKIN DAILY AS DIRECTED      TOUJEO SOLOSTAR U-300 INSULIN 300 unit/mL (1.5 mL) InPn pen INJECT 40 UNITS UNDER THE SKIN ONCE D      traMADoL (ULTRAM) 50 mg tablet Take 1 tablet (50 mg total) by mouth every 6 (six) hours as needed for Pain. 12 tablet 0    urea (UMECTA NAIL FILM PEN) 40 % NFSP Apply 1 application topically 2 (two) times daily. To affected toenail 3 g 10     No current facility-administered medications on file prior to  "visit.       [facility]     Past Surgical History:   Procedure Laterality Date    CHOLECYSTECTOMY      KNEE SURGERY          ALL  Review of patient's allergies indicates:  No Known Allergies    SOC     Social History     Tobacco Use    Smoking status: Never    Smokeless tobacco: Never   Substance Use Topics    Alcohol use: No    Drug use: No         Family HX  No family history on file.       REVIEW OF SYSTEMS  General: This patient is well-developed, well-nourished and appears stated age, well-oriented to person, place and time, and cooperative and pleasant on today's visit  Constitutional: Negative for chills and fever.   Respiratory: Negative for shortness of breath.    Cardiovascular: Negative for chest pain, palpitations, orthopnea  Gastrointestinal: Negative for diarrhea, nausea and vomiting.   Musculoskeletal: Positive for above noted in HPI  Skin: Positive for skin and nail changes  Neurological: Positive for tingling and sensory changes  Peripheral Vascular: no claudication or cyanosis  Psychiatric/Behavioral: Negative for altered mental status       PHYSICAL EXAM  Vitals:    05/30/24 0948   Weight: (!) 163 kg (359 lb 5.6 oz)   Height: 6' 3" (1.905 m)   PainSc: 0-No pain   PainLoc: Foot       GEN:  This patient is well-developed, well-nourished and appears stated age, well-oriented to person, place and time, and cooperative and pleasant on today's visit.      LOWER EXTREMITY    VASCULAR  DP pedal pulse 2/4 RIGHT, LEFT2/4   PT pedal pulse 2/4 RIGHT, LEFT2/4  Capillary refill time immediate to the toes.   Feet are warm to the touch. Skin temperature warm to warm from proximally to distally   There are varicosities, telangiectasias noted to bilateral foot and ankle regions.   There are no ecchymoses noted to bilateral foot and ankle regions.   There is gross lower extremity edema.  There are chronic venous stasis changes     DERMATOLOGIC  Skin moist with healthy texture and turgor.  Thickened, dystrophic, elongated, " discolored toenails with subungal debris 1,2, 5 RIGHT FOOT, 1, 2, , 5 LEFT FOOT    There are no open ulcerations, lacerations, or fissures to bilateral foot and ankle regions. There are no signs of infection as there is no erythema, no proximal-extending lymphangiitis, no fluctuance, or crepitus noted on palpation to bilateral foot and ankle regions.   There is no interdigital maceration.   There are diffuse  hyperkeratotic lesions noted to feet.    NEUROLOGIC  Neurological sensation is grossly intact to all sites tested    ORTHOPEDIC/BIOMECHANICAL  No symptomatic structural abnormalities noted. Muscle strength is 5/5 for foot inverters, everters, plantarflexors, and dorsiflexors. Muscle tone is normal.   Inspection/palpation of bone, joints and muscles unremarkable.    ASSESSMENT  DM type 2 with diabetic peripheral neuropathy    Nail disorder (onychogryphosis)    Uncontrolled type 2 diabetes mellitus with hyperglycemia        PLAN  -The patient was examined and evaulated. Patient was given verbal instructions on maintenance care for mycotic nails. The need for proper skin care in order to prevent infection and colonization in the nails was explained to the patient.      -With patient's permission, the elongated onychomycotic toenails, as outlined in the physical examination, were sharply debrided with a double action nail nipper to their soft tissue attachment. If indicated, the nails were then smoothed down in thickness with an devan board to facilitate in further debridement removing all offending nail and subungual debris. Patient relates relief following the procedure.     Discussed diabetic neuropathy and optimal glucose control is imperative. Patient states he is working on blood sugars and notes improvement.    Disclaimer: This note was partially prepared using a voice recognition system and is likely to have sound alike errors within the text.        Future Appointments   Date Time Provider Department  [place]   9/4/2024 10:00 AM Elissa Landis DPM [facility] POD [place]       Report Electronically Signed By:     Elissa Landis DPM   Podiatry  [facility]-   5/30/2024

## 2024-06-12 ENCOUNTER — OFFICE VISIT (OUTPATIENT)
Dept: PODIATRY | Facility: CLINIC | Age: 49
End: 2024-06-12
Payer: COMMERCIAL

## 2024-06-12 VITALS — WEIGHT: 315 LBS | HEIGHT: 75 IN | BODY MASS INDEX: 39.17 KG/M2

## 2024-06-12 DIAGNOSIS — E11.42 DM TYPE 2 WITH DIABETIC PERIPHERAL NEUROPATHY: ICD-10-CM

## 2024-06-12 DIAGNOSIS — R23.4 FISSURE IN SKIN OF LEFT FOOT: Primary | ICD-10-CM

## 2024-06-12 PROCEDURE — 99213 OFFICE O/P EST LOW 20 MIN: CPT | Mod: S$GLB,,, | Performed by: PODIATRIST

## 2024-06-12 PROCEDURE — 99999 PR PBB SHADOW E&M-EST. PATIENT-LVL IV: CPT | Mod: PBBFAC,,, | Performed by: PODIATRIST

## 2024-06-12 PROCEDURE — 1160F RVW MEDS BY RX/DR IN RCRD: CPT | Mod: CPTII,S$GLB,, | Performed by: PODIATRIST

## 2024-06-12 PROCEDURE — 1159F MED LIST DOCD IN RCRD: CPT | Mod: CPTII,S$GLB,, | Performed by: PODIATRIST

## 2024-06-12 PROCEDURE — 3008F BODY MASS INDEX DOCD: CPT | Mod: CPTII,S$GLB,, | Performed by: PODIATRIST

## 2024-06-12 RX ORDER — MUPIROCIN 20 MG/G
OINTMENT TOPICAL DAILY
Qty: 30 G | Refills: 0 | Status: SHIPPED | OUTPATIENT
Start: 2024-06-12

## 2024-06-12 NOTE — PROGRESS NOTES
Subjective:     Patient ID: Sebastian Ken is a 49 y.o. male.    Chief Complaint: Wound Care (Pt c/o left cracked foot/ swelling,pt c/o pain 4/10, diabetic pt wears casual shoes, PCP Dr. Padilla last seen 3-27-24) and Callouses    Sebastian is a 49 y.o. male who presents to the clinic for evaluation and treatment of high risk feet. Sebastian has a past medical history of Diabetes mellitus, Diabetes mellitus, type 2, Hyperlipidemia, Hypertension, Lipodermatosclerosis, Neuropathy, and Sciatica. The patient's chief complaint is crack of left foot. Patient states he noticed area last week.  This patient has documented high risk feet requiring routine maintenance secondary to diabetes mellitis and those secondary complications of diabetes, as mentioned..    PCP: Kirk Padilla MD    Date Last Seen by PCP: 03/27/2024    Current shoe gear:  Affected Foot: Casual shoes     Unaffected Foot: Casual shoes    History of Trauma: negative  Sign of Infection: none    Hemoglobin A1C   Date Value Ref Range Status   02/19/2011 10.1 (H) 4.0 - 6.2 % Final       There is no problem list on file for this patient.      Medication List with Changes/Refills   New Medications    MUPIROCIN (BACTROBAN) 2 % OINTMENT    Apply topically once daily.   Current Medications    CICLOPIROX (PENLAC) 8 % SOLN    Apply to affected toenails at night time DAILY. On 7th day, file nails down, clean all nails with alcohol and restart application process.    CICLOPIROX (PENLAC) 8 % SOLN    Apply to affected toenails at night time DAILY. On 7th day, file nails down, clean all nails with alcohol and restart application process.    EVOLOCUMAB (REPATHA SURECLICK) 140 MG/ML PNIJ    Inject 140 mg Subcutaneous every 2 weeks for 30 days    FLUTICASONE PROPIONATE (FLONASE) 50 MCG/ACTUATION NASAL SPRAY    by Each Nostril route.    GABAPENTIN (NEURONTIN) 300 MG CAPSULE    Take 1 capsule (300 mg total) by mouth 2 (two) times daily.    GLIMEPIRIDE (AMARYL) 2 MG TABLET    Take 2  mg by mouth before breakfast.    IBUPROFEN (ADVIL,MOTRIN) 800 MG TABLET    Take 800 mg by mouth every 8 (eight) hours as needed.    KETOROLAC (TORADOL) 10 MG TABLET    Take 10 mg by mouth every 8 (eight) hours as needed.    METFORMIN (GLUCOPHAGE) 1000 MG TABLET    Take 1,000 mg by mouth 2 (two) times daily with meals.    METHYLPREDNISOLONE (MEDROL DOSEPACK) 4 MG TABLET    FOLLOW PACKAGE DIRECTIONS    MONTELUKAST (SINGULAIR) 10 MG TABLET    Take 10 mg by mouth.    MOUNJARO 2.5 MG/0.5 ML PNIJ    SMARTSI.5 Milligram(s) SUB-Q Once a Week    MOUNJARO 7.5 MG/0.5 ML PNIJ    Inject into the skin.    NAPROXEN (NAPROSYN) 500 MG TABLET    Take 1 tablet (500 mg total) by mouth 2 (two) times daily with meals.    NAPROXEN (NAPROSYN) 500 MG TABLET    Take 1 tablet (500 mg total) by mouth 2 (two) times daily with meals.    ORPHENADRINE (NORFLEX) 100 MG TABLET    Take 100 mg by mouth 2 (two) times daily as needed.    PRALUENT PEN 75 MG/ML PNIJ    SMARTSI Milligram(s) SUB-Q Every 2 Weeks    PRAVASTATIN (PRAVACHOL) 20 MG TABLET    Take 20 mg by mouth once daily.    PROPRANOLOL (INDERAL) 10 MG TABLET    Take 10 mg by mouth.    RAMIPRIL (ALTACE) 10 MG CAPSULE    Take 10 mg by mouth once daily.    ROSUVASTATIN (CRESTOR) 10 MG TABLET    Take 10 mg by mouth.    SOLIQUA 100/33 100 UNIT-33 MCG/ML INPN PEN    ADMINISTER 35 UNITS UNDER THE SKIN DAILY AS DIRECTED    TOUJEO SOLOSTAR U-300 INSULIN 300 UNIT/ML (1.5 ML) INPN PEN    INJECT 40 UNITS UNDER THE SKIN ONCE D    TRAMADOL (ULTRAM) 50 MG TABLET    Take 1 tablet (50 mg total) by mouth every 6 (six) hours as needed for Pain.    UREA (UMECTA NAIL FILM PEN) 40 % NFSP    Apply 1 application topically 2 (two) times daily. To affected toenail       Review of patient's allergies indicates:  No Known Allergies    Past Surgical History:   Procedure Laterality Date    CHOLECYSTECTOMY      KNEE SURGERY         No family history on file.    Social History     Socioeconomic History    Marital  "status:    Tobacco Use    Smoking status: Never    Smokeless tobacco: Never   Substance and Sexual Activity    Alcohol use: No    Drug use: No       Vitals:    06/12/24 0721   Weight: (!) 163 kg (359 lb 5.6 oz)   Height: 6' 3" (1.905 m)   PainSc:   4       Hemoglobin A1C   Date Value Ref Range Status   02/19/2011 10.1 (H) 4.0 - 6.2 % Final       Review of Systems   Constitutional:  Negative for chills and fever.   Respiratory:  Negative for shortness of breath.    Cardiovascular:  Negative for chest pain, palpitations, orthopnea, claudication and leg swelling.   Gastrointestinal:  Negative for diarrhea, nausea and vomiting.   Musculoskeletal:  Negative for joint pain.   Skin:  Negative for rash.   Neurological:  Positive for tingling and sensory change.   Psychiatric/Behavioral: Negative.           Objective:      PHYSICAL EXAM: Apperance: Alert and orient in no distress,well developed, and with good attention to grooming and body habits  Patient presents ambulating in casual shoes.   Lower Extremity Exam  VASCULAR: Dorsalis pedis pulses 2/4 left and Posterior Tibial pulses 2/4 left. (--) lymphangitis or (--) cellulitis noted left.  DERMATOLOGICAL: (--) edema, (--) erythema, (--) malodor, (--)  drainage, (--) warmth to left foot. Open skin fissure noted to left plantar lateral foot with granular base. The fissure does not extend into deeper tissue and (--) sinus tracts exist.  The dorsum surface of the feet are soft and supple.  The plantar aspects of feet are dry and scaly. Webspaces 1,2,3,4, clean, dry and without evidence of break in skin integrity left. Minimal dry skin noted plantarly on left.  NEUROLOGICAL: Light touch, sharp-dull, proprioception all present and equal bilaterally.    MUSCULOSKELETAL: Muscle strength is 5/5 for foot inverters, everters, plantarflexors, and dorsiflexors. Muscle tone is normal. Inspection/palpation of bone, joints and muscles unremarkable with the exception of that noted " above.          Assessment:       ICD-10-CM ICD-9-CM   1. Fissure in skin of left foot - Left Foot  R23.4 709.8   2. DM type 2 with diabetic peripheral neuropathy  E11.42 250.60     357.2       Plan:   Fissure in skin of left foot - Left Foot  -     mupirocin (BACTROBAN) 2 % ointment; Apply topically once daily.  Dispense: 30 g; Refill: 0    DM type 2 with diabetic peripheral neuropathy      I counseled the patient on his conditions, regarding findings of my examination, my impressions, and usual treatment plan.   Short-term goals include maintaining good offloading and minimizing bioburden, promoting granulation and epithelialization to healing.  Long-term goals include keeping the wound healed by good offloading and medical management under the direction of internist.  Wound was irrigated with sterile saline and cleansed with wound cleanse cloth. The patient tolerated this well. Wound was then dressed with Neosporin and mepilex pad. Patient was given instructions on daily dressing changes. Patient was also instructed on the importance of keeping the wound and dressings clean dry and intact and keeping pressure off the wound area until complete healing of the wound. Home wound care instructions ar provided.  Prescribed Bactroban ointment to be applied to area twice daily.   Follow-up:Patient is to return to the clinic in 3 weeks  for follow-up but should call [facility] immediately if any signs of infection, such as fever, chills, sweats, increased redness or pain.                 Lindy Hernandez DPM  [facility]

## 2024-07-09 ENCOUNTER — OFFICE VISIT (OUTPATIENT)
Dept: PODIATRY | Facility: CLINIC | Age: 49
End: 2024-07-09
Payer: COMMERCIAL

## 2024-07-09 ENCOUNTER — HOSPITAL ENCOUNTER (OUTPATIENT)
Dept: RADIOLOGY | Facility: HOSPITAL | Age: 49
Discharge: HOME | End: 2024-07-09
Attending: PODIATRIST
Payer: COMMERCIAL

## 2024-07-09 VITALS — WEIGHT: 315 LBS | HEIGHT: 75 IN | BODY MASS INDEX: 39.17 KG/M2

## 2024-07-09 DIAGNOSIS — E11.621 TYPE 2 DIABETES MELLITUS WITH LEFT DIABETIC FOOT ULCER: Primary | ICD-10-CM

## 2024-07-09 DIAGNOSIS — L97.529 TYPE 2 DIABETES MELLITUS WITH LEFT DIABETIC FOOT ULCER: ICD-10-CM

## 2024-07-09 DIAGNOSIS — R23.4 FISSURE IN SKIN OF LEFT FOOT: ICD-10-CM

## 2024-07-09 DIAGNOSIS — E11.621 TYPE 2 DIABETES MELLITUS WITH LEFT DIABETIC FOOT ULCER: ICD-10-CM

## 2024-07-09 DIAGNOSIS — L97.529 TYPE 2 DIABETES MELLITUS WITH LEFT DIABETIC FOOT ULCER: Primary | ICD-10-CM

## 2024-07-09 DIAGNOSIS — E11.65 UNCONTROLLED TYPE 2 DIABETES MELLITUS WITH HYPERGLYCEMIA: ICD-10-CM

## 2024-07-09 PROCEDURE — 73630 X-RAY EXAM OF FOOT: CPT | Mod: 26,LT,, | Performed by: STUDENT IN AN ORGANIZED HEALTH CARE EDUCATION/TRAINING PROGRAM

## 2024-07-09 PROCEDURE — 1159F MED LIST DOCD IN RCRD: CPT | Mod: CPTII,S$GLB,, | Performed by: PODIATRIST

## 2024-07-09 PROCEDURE — 99214 OFFICE O/P EST MOD 30 MIN: CPT | Mod: 25,S$GLB,, | Performed by: PODIATRIST

## 2024-07-09 PROCEDURE — 99999 PR PBB SHADOW E&M-EST. PATIENT-LVL V: CPT | Mod: PBBFAC,,, | Performed by: PODIATRIST

## 2024-07-09 PROCEDURE — 73630 X-RAY EXAM OF FOOT: CPT | Mod: TC,FY,PO,LT

## 2024-07-09 PROCEDURE — 3008F BODY MASS INDEX DOCD: CPT | Mod: CPTII,S$GLB,, | Performed by: PODIATRIST

## 2024-07-09 PROCEDURE — 11042 DBRDMT SUBQ TIS 1ST 20SQCM/<: CPT | Mod: S$GLB,,, | Performed by: PODIATRIST

## 2024-07-09 RX ORDER — AMOXICILLIN AND CLAVULANATE POTASSIUM 875; 125 MG/1; MG/1
1 TABLET, FILM COATED ORAL EVERY 12 HOURS
Qty: 20 TABLET | Refills: 0 | Status: SHIPPED | OUTPATIENT
Start: 2024-07-09 | End: 2024-07-19

## 2024-07-09 NOTE — PATIENT INSTRUCTIONS
Thank you for choosing [facility] and Dr. Elissa Landis and her team to take care of you.      I have provided further information for you about your diagnoses and/or aftercare for treatment.     You may receive a survey asking you about your visit today. We hope that we have provided you with a 5-star experience! If you felt that you received exemplary care today, please consider leaving us this feedback on the survey that you will receive in the next few days.     The survey might arrive via email, [employer] messages, text messages, or paper mail.    We sincerely appreciate you!      Sincerely,  Dr. Elissa Landis
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
## 2024-07-23 ENCOUNTER — OFFICE VISIT (OUTPATIENT)
Dept: PODIATRY | Facility: CLINIC | Age: 49
End: 2024-07-23
Payer: COMMERCIAL

## 2024-07-23 VITALS — WEIGHT: 315 LBS | BODY MASS INDEX: 39.17 KG/M2 | HEIGHT: 75 IN

## 2024-07-23 DIAGNOSIS — E11.621 TYPE 2 DIABETES MELLITUS WITH LEFT DIABETIC FOOT ULCER: Primary | ICD-10-CM

## 2024-07-23 DIAGNOSIS — E11.65 UNCONTROLLED TYPE 2 DIABETES MELLITUS WITH HYPERGLYCEMIA: ICD-10-CM

## 2024-07-23 DIAGNOSIS — L97.529 TYPE 2 DIABETES MELLITUS WITH LEFT DIABETIC FOOT ULCER: Primary | ICD-10-CM

## 2024-07-23 DIAGNOSIS — R23.4 FISSURE IN SKIN OF LEFT FOOT: ICD-10-CM

## 2024-07-23 PROCEDURE — 1159F MED LIST DOCD IN RCRD: CPT | Mod: CPTII,S$GLB,, | Performed by: PODIATRIST

## 2024-07-23 PROCEDURE — 3008F BODY MASS INDEX DOCD: CPT | Mod: CPTII,S$GLB,, | Performed by: PODIATRIST

## 2024-07-23 PROCEDURE — 99999 PR PBB SHADOW E&M-EST. PATIENT-LVL IV: CPT | Mod: PBBFAC,,, | Performed by: PODIATRIST

## 2024-07-23 PROCEDURE — 99213 OFFICE O/P EST LOW 20 MIN: CPT | Mod: 25,S$GLB,, | Performed by: PODIATRIST

## 2024-07-23 PROCEDURE — 11042 DBRDMT SUBQ TIS 1ST 20SQCM/<: CPT | Mod: S$GLB,,, | Performed by: PODIATRIST

## 2024-07-23 NOTE — PROGRESS NOTES
Podiatry Department    Patient ID: Sebastian Ken is a 49 y.o. male.    Chief Complaint: Follow-up (2 week f/u for wound, left foot, pt has been applying medi-honey with gauze wrap, pt states he pulled skin at the plantar aspect of the right foot, 0 pain, diabetic, wears casual shoes and socks)    History of Present Illness    CHIEF COMPLAINT:  Patient presents today for a two-week follow-up for a left foot wound.    LEFT FOOT WOUND:  He reports the wound has improved but still has some bleeding. He admits to wearing regular dress shoes instead of the recommended boot and picking at dead skin on the wound, which resulted in tearing some healthy tissue. He expresses concern about infection and has been looking for signs of redness. The wound is still open and bleeding, which he attributes to putting weight on it. He requests a better fitting boot, as the previous one was too small with his toes hanging off the edge. He continues to apply Medi Honey to the wound as instructed and has purchased a larger bottle online, anticipating prolonged use. He denies any difficulty obtaining or applying the medication.    TOENAIL CARE:  He requests toenail trimming. His toenails are currently short but he would like them trimmed further if possible.    ROS:  General: -weight loss            Physical Exam    Skin: Wound present on foot, improved but still open. Fissure present. No signs of infection in wound. Improved skin condition in unspecified area.  Musculoskeletal: Signs of arthritis in great toe.           Diagnoses:  1. Type 2 diabetes mellitus with left diabetic foot ulcer    2. Fissure in skin of left foot - Left Foot    3. Uncontrolled type 2 diabetes mellitus with hyperglycemia        Assessment & Plan    - Patient presents for 2 week follow up of left foot wound, which has improved but is still open and bleeding  - Wound healing is prolonged due to patient wearing regular dress shoes and putting weight on the  foot  - Recommend offloading the foot with a larger walking boot to promote wound healing  - No signs of infection at this time    -Goal of treatment: Prevention of infection and wound healing  -The wound is cleansed and prepped antispetic solution. After obtaining verbal consent from patient, the ulcer was sharply excisionally debrided of viable and nonviable tissue down to good bleeding granular tissue base utilizing sterile #15/#10 blade and tissue nipper and forceps.   Debridement was excisional and included epidermal, dermal, subcutaneous tissues, without muscle and/or fascia.  Removal of all non-viable skin and soft tissues; necrotic skin/tissue formation was performed. The woundbase/wound bed was also debrided to encourage bleeding as to promote/stimulate healing. Post debridement measurements are noted in objective portion of exam.Wound was irrigated with sterile saline and bleeding was controlled with direct pressure. Minimal blood loss. The patient tolerated this well. Wound was then dressed with MEDIHONEY. BOOT applied. STRESS Compliance. . Patient was given instructions on daily dressing changes. Patient was also instructed on the importance of keeping the wound and dressings clean dry and intact and keeping pressure off the wound area until complete healing of the wound.  The patient is alerted to watch for any signs of infection (redness, pus, pain, increased swelling or fever) and call if such occurs. Home wound care instructions are provided.  -Discussed potential complications with the patient and all questions fully answered.   -RTC as scheduled, or sooner if condition worsens    FOOT WOUND:  - Limit weight bearing on left foot as much as possible to allow wound to heal.  - Continue local wound care by applying Medihoney to the wound, then covering with gauze and securing with Coban or Ace wrap.  - Continued Medihoney topical and patient has refilled a larger quantity online.  - Follow up in 2  weeks for wound recheck.  - With offloading in the walking boot, anticipate the wound should be healed at the 2 week follow up visit.              Future Appointments   Date Time Provider Department Center   8/13/2024 10:30 AM [facility] [place]   9/4/2024 10:00 AM Elissa Landis DPM [facility] P[facility] [place]        This note was generated with the assistance of ambient listening technology. Verbal consent was obtained by the patient and accompanying visitor(s) for the recording of patient appointment to facilitate this note. I attest to having reviewed and edited the generated note for accuracy, though some syntax or spelling errors may persist. Please contact the author of this note for any clarification.      Report Electronically Signed By:     Elissa Landis DPM   Podiatry  [facility]-   7/23/2024

## 2024-08-13 ENCOUNTER — OFFICE VISIT (OUTPATIENT)
Dept: PODIATRY | Facility: CLINIC | Age: 49
End: 2024-08-13
Payer: COMMERCIAL

## 2024-08-13 VITALS — WEIGHT: 315 LBS | HEIGHT: 75 IN | BODY MASS INDEX: 39.17 KG/M2

## 2024-08-13 DIAGNOSIS — R23.4 FISSURE IN SKIN OF LEFT FOOT: Primary | ICD-10-CM

## 2024-08-13 DIAGNOSIS — E11.65 UNCONTROLLED TYPE 2 DIABETES MELLITUS WITH HYPERGLYCEMIA: ICD-10-CM

## 2024-08-13 PROCEDURE — 99213 OFFICE O/P EST LOW 20 MIN: CPT | Mod: S$GLB,,, | Performed by: PODIATRIST

## 2024-08-13 PROCEDURE — 3008F BODY MASS INDEX DOCD: CPT | Mod: CPTII,S$GLB,, | Performed by: PODIATRIST

## 2024-08-13 PROCEDURE — 1159F MED LIST DOCD IN RCRD: CPT | Mod: CPTII,S$GLB,, | Performed by: PODIATRIST

## 2024-08-13 PROCEDURE — 99999 PR PBB SHADOW E&M-EST. PATIENT-LVL IV: CPT | Mod: PBBFAC,,, | Performed by: PODIATRIST

## 2024-08-13 NOTE — PROGRESS NOTES
Podiatry Department    Patient ID: Sebastian Ken is a 49 y.o. male.    Chief Complaint: Follow-up (2 week f/u wound care, left foot, plantar aspect, has been applying medi-honey, pt has been in walking boot since last visit, improvement, 0 pain, diabetic, wears casual shoes and socks)    History of Present Illness    CHIEF COMPLAINT:  Patient presents today for follow up.    FOOT WOUND AND SKIN CARE:  He reports a history of a fissure on his left foot, which has been healing well with consistent use of the boot. He had previously stopped using the boot, resulting in worsening of the condition. He has been applying diabetic lotion to the affected area and notes improvement in both feet, particularly the right foot, which he believes looks significantly better. He denies any acute signs of drainage or open wounds. He has not been using urea cream or Aquaphor as part of his skin care regimen.    SOCIAL HISTORY:  He works at the [employer].    ROS:  Skin: -lesion            Physical Exam    Skin: Diffuse hyperkeratotic tissue. Dried fissure post plantar lateral left foot. No acute signs of drainage. Wound completely healed. No heat. No wound.           Diagnoses:  1. Fissure in skin of left foot - Left Foot    2. Uncontrolled type 2 diabetes mellitus with hyperglycemia        Assessment & Plan    - Assessed wound on left foot, noting complete healing of previously observed fissure  - Determined need for moisturizing regimen to prevent recurrence of fissure and manage hyperkeratotic tissue  DRY SKIN ON FEET:  - Explained importance of consistent moisturizing routine for foot care.  - Patient to implement daily moisturizing routine for feet.  - Started urea cream combined with Aquaphor ointment.  - Apply 2 times daily to affected areas on feet.  - Apply cream first, followed by ointment to lock in moisture.  FOOT INJURY:  - Patient to continue using protective boot as previously instructed.               Future Appointments   Date Time Provider Department Center   9/4/2024 10:00 AM Elissa Landis DPM [facility] POD [facility]        This note was generated with the assistance of ambient listening technology. Verbal consent was obtained by the patient and accompanying visitor(s) for the recording of patient appointment to facilitate this note. I attest to having reviewed and edited the generated note for accuracy, though some syntax or spelling errors may persist. Please contact the author of this note for any clarification.      Report Electronically Signed By:     Elissa Landis DPM   Podiatry  [facility]-   8/13/2024

## 2024-09-19 ENCOUNTER — OFFICE VISIT (OUTPATIENT)
Dept: PODIATRY | Facility: CLINIC | Age: 49
End: 2024-09-19
Payer: COMMERCIAL

## 2024-09-19 VITALS — HEIGHT: 75 IN | BODY MASS INDEX: 39.17 KG/M2 | WEIGHT: 315 LBS

## 2024-09-19 DIAGNOSIS — B35.1 PAIN DUE TO ONYCHOMYCOSIS OF TOENAILS OF BOTH FEET: ICD-10-CM

## 2024-09-19 DIAGNOSIS — E11.65 UNCONTROLLED TYPE 2 DIABETES MELLITUS WITH HYPERGLYCEMIA: Primary | ICD-10-CM

## 2024-09-19 DIAGNOSIS — M79.674 PAIN DUE TO ONYCHOMYCOSIS OF TOENAILS OF BOTH FEET: ICD-10-CM

## 2024-09-19 DIAGNOSIS — B35.1 DERMATOPHYTOSIS OF NAIL: ICD-10-CM

## 2024-09-19 DIAGNOSIS — M79.675 PAIN DUE TO ONYCHOMYCOSIS OF TOENAILS OF BOTH FEET: ICD-10-CM

## 2024-09-19 DIAGNOSIS — E11.42 DM TYPE 2 WITH DIABETIC PERIPHERAL NEUROPATHY: ICD-10-CM

## 2024-09-19 PROCEDURE — 11721 DEBRIDE NAIL 6 OR MORE: CPT | Mod: S$GLB,,, | Performed by: PODIATRIST

## 2024-09-19 PROCEDURE — 99999 PR PBB SHADOW E&M-EST. PATIENT-LVL IV: CPT | Mod: PBBFAC,,, | Performed by: PODIATRIST

## 2024-09-19 PROCEDURE — 1159F MED LIST DOCD IN RCRD: CPT | Mod: CPTII,S$GLB,, | Performed by: PODIATRIST

## 2024-09-19 PROCEDURE — 3008F BODY MASS INDEX DOCD: CPT | Mod: CPTII,S$GLB,, | Performed by: PODIATRIST

## 2024-09-19 PROCEDURE — 99213 OFFICE O/P EST LOW 20 MIN: CPT | Mod: 25,S$GLB,, | Performed by: PODIATRIST

## 2024-09-22 NOTE — PROGRESS NOTES
PODIATRIC MEDICINE AND SURGERY      CHIEF COMPLAINT  Chief Complaint   Patient presents with    Routine Foot Care     3 month RFC, pt rates pain 0/10, just tingling, pt is diabetic, pt last seen pcp Kirk Padilla MD  3/27/24         HPI    SUBJECTIVE: Sebastian Ken is a 49 y.o. male who  has a past medical history of Diabetes mellitus, Diabetes mellitus, type 2, Hyperlipidemia, Hypertension, Lipodermatosclerosis, Neuropathy, and Sciatica. Sebastian presenting to podiatry clinic with complaint of thickened, discolored, and painful toenails. Pt states nails result in pain with enclosed shoe wear aggravated due to pressure  and/or with ambulation.  They are unable to trim nails. They are requesting nail care for relief of painful symptoms. He has been diagnosed with diabetic neuropathy. He is being managed by neurology. He does admit to numbness, tingling, and burning sensation to feet. He does have uncontrolled DM2.  Patient has no further pedal complaints.    Hemoglobin A1C   Date Value Ref Range Status   02/19/2011 10.1 (H) 4.0 - 6.2 % Final         PMH  Past Medical History:   Diagnosis Date    Diabetes mellitus     Diabetes mellitus, type 2     Hyperlipidemia     Hypertension     Lipodermatosclerosis     Neuropathy     Sciatica      There is no problem list on file for this patient.        MEDS  Current Outpatient Medications on File Prior to Visit   Medication Sig Dispense Refill    ciclopirox (PENLAC) 8 % Soln Apply to affected toenails at night time DAILY. On 7th day, file nails down, clean all nails with alcohol and restart application process. 1 Bottle 10    ciclopirox (PENLAC) 8 % Soln Apply to affected toenails at night time DAILY. On 7th day, file nails down, clean all nails with alcohol and restart application process. 1 Bottle 10    evolocumab (REPATHA SURECLICK) 140 mg/mL PnIj Inject 140 mg Subcutaneous every 2 weeks for 30 days      fluticasone propionate (FLONASE) 50 mcg/actuation nasal  spray by Each Nostril route.      gabapentin (NEURONTIN) 300 MG capsule Take 1 capsule (300 mg total) by mouth 2 (two) times daily. 30 capsule 3    glimepiride (AMARYL) 2 MG tablet Take 2 mg by mouth before breakfast.      ibuprofen (ADVIL,MOTRIN) 800 MG tablet Take 800 mg by mouth every 8 (eight) hours as needed.      ketorolac (TORADOL) 10 mg tablet Take 10 mg by mouth every 8 (eight) hours as needed.      metFORMIN (GLUCOPHAGE) 1000 MG tablet Take 1,000 mg by mouth 2 (two) times daily with meals.      methylPREDNISolone (MEDROL DOSEPACK) 4 mg tablet       montelukast (SINGULAIR) 10 mg tablet Take 10 mg by mouth.      MOUNJARO 2.5 mg/0.5 mL PnIj SMARTSI.5 Milligram(s) SUB-Q Once a Week      MOUNJARO 7.5 mg/0.5 mL PnIj Inject into the skin.      mupirocin (BACTROBAN) 2 % ointment Apply topically once daily. 30 g 0    naproxen (NAPROSYN) 500 MG tablet Take 1 tablet (500 mg total) by mouth 2 (two) times daily with meals. 20 tablet 0    naproxen (NAPROSYN) 500 MG tablet Take 1 tablet (500 mg total) by mouth 2 (two) times daily with meals. 20 tablet 0    orphenadrine (NORFLEX) 100 mg tablet Take 100 mg by mouth 2 (two) times daily as needed.      PRALUENT PEN 75 mg/mL PnIj SMARTSI Milligram(s) SUB-Q Every 2 Weeks      pravastatin (PRAVACHOL) 20 MG tablet Take 20 mg by mouth once daily.      propranoloL (INDERAL) 10 MG tablet Take 10 mg by mouth.      ramipriL (ALTACE) 10 MG capsule Take 10 mg by mouth once daily.      rosuvastatin (CRESTOR) 10 MG tablet Take 10 mg by mouth.      SOLIQUA 100/33 100 unit-33 mcg/mL InPn pen ADMINISTER 35 UNITS UNDER THE SKIN DAILY AS DIRECTED      TOUJEO SOLOSTAR U-300 INSULIN 300 unit/mL (1.5 mL) InPn pen INJECT 40 UNITS UNDER THE SKIN ONCE D      traMADoL (ULTRAM) 50 mg tablet Take 1 tablet (50 mg total) by mouth every 6 (six) hours as needed for Pain. 12 tablet 0    urea (UMECTA NAIL FILM PEN) 40 % NFSP Apply 1 application topically 2 (two) times daily. To affected toenail 3 g  "10     No current facility-administered medications on file prior to visit.       PS     Past Surgical History:   Procedure Laterality Date    CHOLECYSTECTOMY      KNEE SURGERY          ALL  Review of patient's allergies indicates:  No Known Allergies    SOC     Social History     Tobacco Use    Smoking status: Never    Smokeless tobacco: Never   Substance Use Topics    Alcohol use: No    Drug use: No         Family HX  No family history on file.       REVIEW OF SYSTEMS  General: This patient is well-developed, well-nourished and appears stated age, well-oriented to person, place and time, and cooperative and pleasant on today's visit  Constitutional: Negative for chills and fever.   Respiratory: Negative for shortness of breath.    Cardiovascular: Negative for chest pain, palpitations, orthopnea  Gastrointestinal: Negative for diarrhea, nausea and vomiting.   Musculoskeletal: Positive for above noted in HPI  Skin: Positive for skin and nail changes  Neurological: Positive for tingling and sensory changes  Peripheral Vascular: no claudication or cyanosis  Psychiatric/Behavioral: Negative for altered mental status       PHYSICAL EXAM  Vitals:    09/19/24 1533   Weight: (!) 163 kg (359 lb 5.6 oz)   Height: 6' 3" (1.905 m)   PainSc: 0-No pain       GEN:  This patient is well-developed, well-nourished and appears stated age, well-oriented to person, place and time, and cooperative and pleasant on today's visit.      LOWER EXTREMITY    VASCULAR  DP pedal pulse 2/4 RIGHT, LEFT2/4   PT pedal pulse 2/4 RIGHT, LEFT2/4  Capillary refill time immediate to the toes.   Feet are warm to the touch. Skin temperature warm to warm from proximally to distally   There are varicosities, telangiectasias noted to bilateral foot and ankle regions.   There are no ecchymoses noted to bilateral foot and ankle regions.   There is gross lower extremity edema.  There are chronic venous stasis changes     DERMATOLOGIC  Skin moist with healthy " texture and turgor.  Thickened, dystrophic, elongated, discolored toenails with subungal debris 1,2, 5 RIGHT FOOT, 1, 2, , 5 LEFT FOOT    There are no open ulcerations, lacerations, or fissures to bilateral foot and ankle regions. There are no signs of infection as there is no erythema, no proximal-extending lymphangiitis, no fluctuance, or crepitus noted on palpation to bilateral foot and ankle regions.   There is no interdigital maceration.   There are diffuse  hyperkeratotic lesions noted to feet.    NEUROLOGIC  Neurological sensation is grossly intact to all sites tested    ORTHOPEDIC/BIOMECHANICAL  No symptomatic structural abnormalities noted. Muscle strength is 5/5 for foot inverters, everters, plantarflexors, and dorsiflexors. Muscle tone is normal.   Inspection/palpation of bone, joints and muscles unremarkable.    ASSESSMENT  Uncontrolled type 2 diabetes mellitus with hyperglycemia    DM type 2 with diabetic peripheral neuropathy    Dermatophytosis of nail    Pain due to onychomycosis of toenails of both feet        PLAN  -The patient was examined and evaulated. Patient was given verbal instructions on maintenance care for mycotic nails. The need for proper skin care in order to prevent infection and colonization in the nails was explained to the patient.      -With patient's permission, the elongated onychomycotic toenails, as outlined in the physical examination, were sharply debrided with a double action nail nipper to their soft tissue attachment. If indicated, the nails were then smoothed down in thickness with an devan board to facilitate in further debridement removing all offending nail and subungual debris. Patient relates relief following the procedure.     Discussed diabetic neuropathy and optimal glucose control is imperative. Patient states he is working on blood sugars and notes improvement.    Disclaimer: This note was partially prepared using a voice recognition system and is likely to have  sound alike errors within the text.        Future Appointments   Date Time Provider Department Center   12/19/2024  3:00 PM Elissa Landis DPM [facility] POD [place]       Report Electronically Signed By:     Elissa Landis DPM   Podiatry  [facility]-   9/22/2024

## 2025-02-19 ENCOUNTER — OFFICE VISIT (OUTPATIENT)
Dept: PODIATRY | Facility: CLINIC | Age: 50
End: 2025-02-19
Payer: COMMERCIAL

## 2025-02-19 VITALS — BODY MASS INDEX: 39.17 KG/M2 | WEIGHT: 315 LBS | HEIGHT: 75 IN

## 2025-02-19 DIAGNOSIS — S91.302A WOUND OF LEFT FOOT: ICD-10-CM

## 2025-02-19 DIAGNOSIS — E11.42 DM TYPE 2 WITH DIABETIC PERIPHERAL NEUROPATHY: ICD-10-CM

## 2025-02-19 DIAGNOSIS — E11.65 UNCONTROLLED TYPE 2 DIABETES MELLITUS WITH HYPERGLYCEMIA: Primary | ICD-10-CM

## 2025-02-19 DIAGNOSIS — B35.1 DERMATOPHYTOSIS OF NAIL: ICD-10-CM

## 2025-02-19 DIAGNOSIS — R23.4 FISSURE IN SKIN OF LEFT FOOT: ICD-10-CM

## 2025-02-19 NOTE — PROGRESS NOTES
PODIATRIC MEDICINE AND SURGERY      CHIEF COMPLAINT  Chief Complaint   Patient presents with    Nail Care     3 month nail care. Diabetic. 0/10 pain. Dress shoes and socks. Last PCP appt was 9/25/2024 Dr MADDEN    SUBJECTIVE: Sebastian Ken is a 50 y.o. male who  has a past medical history of Diabetes mellitus, Diabetes mellitus, type 2, Hyperlipidemia, Hypertension, Lipodermatosclerosis, Neuropathy, and Sciatica. Sebastian presenting to podiatry clinic with complaint of thickened, discolored, and painful toenails. Pt states nails result in pain with enclosed shoe wear aggravated due to pressure  and/or with ambulation.  They are unable to trim nails. They are requesting nail care for relief of painful symptoms. He has been diagnosed with diabetic neuropathy. He is being managed by neurology. He does admit to numbness, tingling, and burning sensation to feet. He does have uncontrolled DM2.      States wound on bottom of LEFT foot has reopened. He has been treating with topical wound care. Patient has no further pedal complaints.    Hemoglobin A1C   Date Value Ref Range Status   02/19/2011 10.1 (H) 4.0 - 6.2 % Final         PMH  Past Medical History:   Diagnosis Date    Diabetes mellitus     Diabetes mellitus, type 2     Hyperlipidemia     Hypertension     Lipodermatosclerosis     Neuropathy     Sciatica      There is no problem list on file for this patient.        MEDS  Current Outpatient Medications on File Prior to Visit   Medication Sig Dispense Refill    gabapentin (NEURONTIN) 300 MG capsule Take 1 capsule (300 mg total) by mouth 2 (two) times daily. 30 capsule 3    ketorolac (TORADOL) 10 mg tablet Take 10 mg by mouth every 8 (eight) hours as needed.      metFORMIN (GLUCOPHAGE) 1000 MG tablet Take 1,000 mg by mouth 2 (two) times daily with meals.      propranoloL (INDERAL) 10 MG tablet Take 10 mg by mouth.      ramipriL (ALTACE) 10 MG capsule Take 10 mg by mouth once daily.      rosuvastatin  (CRESTOR) 10 MG tablet Take 10 mg by mouth.      ciclopirox (PENLAC) 8 % Soln Apply to affected toenails at night time DAILY. On 7th day, file nails down, clean all nails with alcohol and restart application process. (Patient not taking: Reported on 2025) 1 Bottle 10    ciclopirox (PENLAC) 8 % Soln Apply to affected toenails at night time DAILY. On 7th day, file nails down, clean all nails with alcohol and restart application process. (Patient not taking: Reported on 2025) 1 Bottle 10    evolocumab (REPATHA SURECLICK) 140 mg/mL PnIj Inject 140 mg Subcutaneous every 2 weeks for 30 days (Patient not taking: Reported on 2025)      fluticasone propionate (FLONASE) 50 mcg/actuation nasal spray by Each Nostril route. (Patient not taking: Reported on 2025)      glimepiride (AMARYL) 2 MG tablet Take 2 mg by mouth before breakfast. (Patient not taking: Reported on 2025)      ibuprofen (ADVIL,MOTRIN) 800 MG tablet Take 800 mg by mouth every 8 (eight) hours as needed. (Patient not taking: Reported on 2025)      methylPREDNISolone (MEDROL DOSEPACK) 4 mg tablet  (Patient not taking: Reported on 2025)      montelukast (SINGULAIR) 10 mg tablet Take 10 mg by mouth. (Patient not taking: Reported on 2025)      MOUNJARO 2.5 mg/0.5 mL PnIj SMARTSI.5 Milligram(s) SUB-Q Once a Week (Patient not taking: Reported on 2025)      MOUNJARO 7.5 mg/0.5 mL PnIj Inject into the skin. (Patient not taking: Reported on 2025)      mupirocin (BACTROBAN) 2 % ointment Apply topically once daily. (Patient not taking: Reported on 2025) 30 g 0    naproxen (NAPROSYN) 500 MG tablet Take 1 tablet (500 mg total) by mouth 2 (two) times daily with meals. (Patient not taking: Reported on 2025) 20 tablet 0    naproxen (NAPROSYN) 500 MG tablet Take 1 tablet (500 mg total) by mouth 2 (two) times daily with meals. (Patient not taking: Reported on 2025) 20 tablet 0    orphenadrine (NORFLEX) 100 mg  tablet Take 100 mg by mouth 2 (two) times daily as needed. (Patient not taking: Reported on 2025)      PRALUENT PEN 75 mg/mL PnIj SMARTSI Milligram(s) SUB-Q Every 2 Weeks (Patient not taking: Reported on 2025)      pravastatin (PRAVACHOL) 20 MG tablet Take 20 mg by mouth once daily. (Patient not taking: Reported on 2025)      SOLIQUA 100/33 100 unit-33 mcg/mL InPn pen ADMINISTER 35 UNITS UNDER THE SKIN DAILY AS DIRECTED (Patient not taking: Reported on 2025)      TOUJEO SOLOSTAR U-300 INSULIN 300 unit/mL (1.5 mL) InPn pen INJECT 40 UNITS UNDER THE SKIN ONCE D (Patient not taking: Reported on 2025)      traMADoL (ULTRAM) 50 mg tablet Take 1 tablet (50 mg total) by mouth every 6 (six) hours as needed for Pain. (Patient not taking: Reported on 2025) 12 tablet 0    urea (UMECTA NAIL FILM PEN) 40 % NFSP Apply 1 application topically 2 (two) times daily. To affected toenail (Patient not taking: Reported on 2025) 3 g 10     No current facility-administered medications on file prior to visit.       PSH     Past Surgical History:   Procedure Laterality Date    CHOLECYSTECTOMY      KNEE SURGERY          ALL  Review of patient's allergies indicates:  No Known Allergies    SOC     Social History     Tobacco Use    Smoking status: Never    Smokeless tobacco: Never   Substance Use Topics    Alcohol use: No    Drug use: No         Family HX    Family History   Problem Relation Name Age of Onset    Other (, CVA) Father            REVIEW OF SYSTEMS  General: This patient is well-developed, well-nourished and appears stated age, well-oriented to person, place and time, and cooperative and pleasant on today's visit  Constitutional: Negative for chills and fever.   Respiratory: Negative for shortness of breath.    Cardiovascular: Negative for chest pain, palpitations, orthopnea  Gastrointestinal: Negative for diarrhea, nausea and vomiting.   Musculoskeletal: Positive for above noted in HPI  Skin:  "Positive for skin and nail changes  Neurological: Positive for tingling and sensory changes  Peripheral Vascular: no claudication or cyanosis  Psychiatric/Behavioral: Negative for altered mental status       PHYSICAL EXAM  Vitals:    02/19/25 1526   Weight: (!) 163 kg (359 lb 5.6 oz)   Height: 6' 3" (1.905 m)       GEN:  This patient is well-developed, well-nourished and appears stated age, well-oriented to person, place and time, and cooperative and pleasant on today's visit.      LOWER EXTREMITY    VASCULAR  DP pedal pulse 2/4 RIGHT, LEFT2/4   PT pedal pulse 2/4 RIGHT, LEFT2/4  Capillary refill time immediate to the toes.   Feet are warm to the touch. Skin temperature warm to warm from proximally to distally   There are varicosities, telangiectasias noted to bilateral foot and ankle regions.   There are no ecchymoses noted to bilateral foot and ankle regions.   There is gross lower extremity edema.  There are chronic venous stasis changes     DERMATOLOGIC  Skin moist with healthy texture and turgor.  Thickened, dystrophic, elongated, discolored toenails with subungal debris 1,2, 5 RIGHT FOOT, 1, 2, , 5 LEFT FOOT    There are no open ulcerations, lacerations, or fissures to bilateral foot and ankle regions. There are no signs of infection as there is no erythema, no proximal-extending lymphangiitis, no fluctuance, or crepitus noted on palpation to bilateral foot and ankle regions.   There is no interdigital maceration.   There are diffuse  hyperkeratotic lesions noted to feet.  Sub 5th LEFT foot partial thickness wound measuring 0.5 cm x 0.3 cm x 0.1 cm. No acute SOI    NEUROLOGIC  Neurological sensation is grossly intact to all sites tested    ORTHOPEDIC/BIOMECHANICAL  No symptomatic structural abnormalities noted. Muscle strength is 5/5 for foot inverters, everters, plantarflexors, and dorsiflexors. Muscle tone is normal.   Inspection/palpation of bone, joints and muscles unremarkable.    ASSESSMENT  Uncontrolled " type 2 diabetes mellitus with hyperglycemia    DM type 2 with diabetic peripheral neuropathy    Dermatophytosis of nail    Wound of left foot          PLAN  -The patient was examined and evaulated. Patient was given verbal instructions on maintenance care for mycotic nails. The need for proper skin care in order to prevent infection and colonization in the nails was explained to the patient.      -With patient's permission, the elongated onychomycotic toenails, as outlined in the physical examination, were sharply debrided with a double action nail nipper to their soft tissue attachment. If indicated, the nails were then smoothed down in thickness with an devan board to facilitate in further debridement removing all offending nail and subungual debris. Patient relates relief following the procedure.     -Goal of treatment: Prevention of infection and wound healing  -The wound is cleansed and prepped antispetic solution. After obtaining verbal consent from patient, the ulcer was sharply excisionally debrided of viable and nonviable tissue down to good bleeding granular tissue base utilizing sterile #15/#10 blade and tissue nipper and forceps.   Debridement was excisional and included epidermal, dermal, subcutaneous tissues, without muscle and/or fascia.  Removal of all non-viable skin and soft tissues; necrotic skin/tissue formation was performed. The woundbase/wound bed was also debrided to encourage bleeding as to promote/stimulate healing. Post debridement measurements are noted in objective portion of exam.Wound was irrigated with sterile saline and bleeding was controlled with direct pressure. Minimal blood loss. The patient tolerated this well. Wound was then dressed with MEDIHONEY compressive wrap and offloading shoe. Patient was given instructions on daily dressing changes. Patient was also instructed on the importance of keeping the wound and dressings clean dry and intact and keeping pressure off the wound  area until complete healing of the wound.  The patient is alerted to watch for any signs of infection (redness, pus, pain, increased swelling or fever) and call if such occurs. Home wound care instructions are provided.  -Discussed potential complications with the patient and all questions fully answered.   -RTC as scheduled, or sooner if condition worsens      Disclaimer: This note was partially prepared using a voice recognition system and is likely to have sound alike errors within the text.        No future appointments.      Report Electronically Signed By:     Elissa Landis DPM   Podiatry  [facility]  2/19/2025

## 2025-04-28 ENCOUNTER — HOSPITAL ENCOUNTER (EMERGENCY)
Facility: HOSPITAL | Age: 50
Discharge: HOME | End: 2025-04-29
Attending: EMERGENCY MEDICINE
Payer: COMMERCIAL

## 2025-04-28 DIAGNOSIS — E11.65 HYPERGLYCEMIA DUE TO DIABETES MELLITUS: ICD-10-CM

## 2025-04-28 DIAGNOSIS — R00.2 PALPITATIONS: Primary | ICD-10-CM

## 2025-04-28 DIAGNOSIS — R00.0 RAPID HEART BEAT: ICD-10-CM

## 2025-04-28 LAB
ABSOLUTE EOSINOPHIL (OHS): 0.29 K/UL
ABSOLUTE MONOCYTE (OHS): 0.39 K/UL (ref 0.3–1)
ABSOLUTE NEUTROPHIL COUNT (OHS): 4.19 K/UL (ref 1.8–7.7)
ALBUMIN SERPL BCP-MCNC: 3.7 G/DL (ref 3.5–5.2)
ALP SERPL-CCNC: 119 UNIT/L (ref 40–150)
ALT SERPL W/O P-5'-P-CCNC: 37 UNIT/L (ref 10–44)
ANION GAP (OHS): 10 MMOL/L (ref 8–16)
AST SERPL-CCNC: 26 UNIT/L (ref 11–45)
BASOPHILS # BLD AUTO: 0.05 K/UL
BASOPHILS NFR BLD AUTO: 0.6 %
BILIRUB SERPL-MCNC: 0.4 MG/DL (ref 0.1–1)
BNP SERPL-MCNC: <10 PG/ML (ref 0–99)
BUN SERPL-MCNC: 15 MG/DL (ref 6–20)
CALCIUM SERPL-MCNC: 9.1 MG/DL (ref 8.7–10.5)
CHLORIDE SERPL-SCNC: 105 MMOL/L (ref 95–110)
CO2 SERPL-SCNC: 22 MMOL/L (ref 23–29)
CREAT SERPL-MCNC: 0.8 MG/DL (ref 0.5–1.4)
ERYTHROCYTE [DISTWIDTH] IN BLOOD BY AUTOMATED COUNT: 14.1 % (ref 11.5–14.5)
GFR SERPLBLD CREATININE-BSD FMLA CKD-EPI: >60 ML/MIN/1.73/M2
GLUCOSE SERPL-MCNC: 313 MG/DL (ref 70–110)
HCT VFR BLD AUTO: 40 % (ref 40–54)
HGB BLD-MCNC: 13.6 GM/DL (ref 14–18)
IMM GRANULOCYTES # BLD AUTO: 0.06 K/UL (ref 0–0.04)
IMM GRANULOCYTES NFR BLD AUTO: 0.7 % (ref 0–0.5)
LYMPHOCYTES # BLD AUTO: 3.36 K/UL (ref 1–4.8)
MCH RBC QN AUTO: 28 PG (ref 27–31)
MCHC RBC AUTO-ENTMCNC: 34 G/DL (ref 32–36)
MCV RBC AUTO: 82 FL (ref 82–98)
NUCLEATED RBC (/100WBC) (OHS): 0 /100 WBC
PLATELET # BLD AUTO: 207 K/UL (ref 150–450)
PMV BLD AUTO: 10 FL (ref 9.2–12.9)
POTASSIUM SERPL-SCNC: 4.2 MMOL/L (ref 3.5–5.1)
PROT SERPL-MCNC: 7.3 GM/DL (ref 6–8.4)
RBC # BLD AUTO: 4.86 M/UL (ref 4.6–6.2)
RELATIVE EOSINOPHIL (OHS): 3.5 %
RELATIVE LYMPHOCYTE (OHS): 40.3 % (ref 18–48)
RELATIVE MONOCYTE (OHS): 4.7 % (ref 4–15)
RELATIVE NEUTROPHIL (OHS): 50.2 % (ref 38–73)
SODIUM SERPL-SCNC: 137 MMOL/L (ref 136–145)
TROPONIN I SERPL DL<=0.01 NG/ML-MCNC: 0.01 NG/ML
TSH SERPL-ACNC: 1.06 UIU/ML (ref 0.4–4)
WBC # BLD AUTO: 8.34 K/UL (ref 3.9–12.7)

## 2025-04-28 PROCEDURE — 93005 ELECTROCARDIOGRAM TRACING: CPT

## 2025-04-28 PROCEDURE — 83880 ASSAY OF NATRIURETIC PEPTIDE: CPT

## 2025-04-28 PROCEDURE — 99285 EMERGENCY DEPT VISIT HI MDM: CPT | Mod: 25

## 2025-04-28 PROCEDURE — 80053 COMPREHEN METABOLIC PANEL: CPT

## 2025-04-28 PROCEDURE — 93010 ELECTROCARDIOGRAM REPORT: CPT | Mod: ,,, | Performed by: INTERNAL MEDICINE

## 2025-04-28 PROCEDURE — 84484 ASSAY OF TROPONIN QUANT: CPT

## 2025-04-28 PROCEDURE — 84443 ASSAY THYROID STIM HORMONE: CPT | Performed by: EMERGENCY MEDICINE

## 2025-04-28 PROCEDURE — 96360 HYDRATION IV INFUSION INIT: CPT

## 2025-04-28 PROCEDURE — 25000003 PHARM REV CODE 250: Performed by: EMERGENCY MEDICINE

## 2025-04-28 PROCEDURE — 85025 COMPLETE CBC W/AUTO DIFF WBC: CPT

## 2025-04-28 RX ADMIN — SODIUM CHLORIDE 1000 ML: 9 INJECTION, SOLUTION INTRAVENOUS at 11:04

## 2025-04-29 VITALS
SYSTOLIC BLOOD PRESSURE: 123 MMHG | WEIGHT: 315 LBS | OXYGEN SATURATION: 99 % | HEIGHT: 75 IN | HEART RATE: 74 BPM | DIASTOLIC BLOOD PRESSURE: 67 MMHG | RESPIRATION RATE: 21 BRPM | BODY MASS INDEX: 39.17 KG/M2 | TEMPERATURE: 98 F

## 2025-04-29 LAB
OHS QRS DURATION: 80 MS
OHS QTC CALCULATION: 406 MS
POCT GLUCOSE: 245 MG/DL (ref 70–110)

## 2025-04-29 PROCEDURE — 82962 GLUCOSE BLOOD TEST: CPT

## 2025-04-29 NOTE — ED PROVIDER NOTES
SCRIBE #1 NOTE: I, Linda Schrader, am scribing for, and in the presence of, Flor Levine MD. I have scribed the entire note.       History     Chief Complaint   Patient presents with    Palpitations     Cc of palpitations that started on Saturday while at his a baseball game. Denies chest pain, SOB, dizziness. +weakness. PTA pt took propanolol and baby ASA 81.     Review of patient's allergies indicates:  No Known Allergies      History of Present Illness     HPI    4/28/2025, 11:03 PM  History obtained from the patient and medical records      History of Present Illness: Sebastian Ken is a 50 y.o. male patient with a PMHx of Dm type 2, lipo dermatosclerosis, sciatica, neuropathy, HTN, and HLD who presents to the Emergency Department for evaluation of palpitations which began Saturday while at his son's baseball game. Pt thought he was dehydrated so he drank 6 Powerades. Pt has not felt normal since. Pt took propanolol and baby ASA 81 PTA. Pt denies taking his DM medication. Symptoms are constant and moderate in severity. No mitigating or exacerbating factors reported. Associated sxs include diaphoresis and weakness. Patient denies any generalized pain, dizziness, N/V, fever, diarrhea, or CP. Denies any recent travels. No further complaints or concerns at this time.       Arrival mode: Personal Transportation    PCP: Kirk Padilla MD        Past Medical History:  Past Medical History:   Diagnosis Date    Diabetes mellitus     Diabetes mellitus, type 2     Hyperlipidemia     Hypertension     Lipodermatosclerosis     Neuropathy     Sciatica        Past Surgical History:  Past Surgical History:   Procedure Laterality Date    CHOLECYSTECTOMY      KNEE SURGERY           Family History:  Family History   Problem Relation Name Age of Onset    Other (, CVA) Father         Social History:  Social History     Tobacco Use    Smoking status: Never    Smokeless tobacco: Never   Substance and Sexual Activity     Alcohol use: No    Drug use: No    Sexual activity: Not on file        Review of Systems     Review of Systems   Constitutional:  Positive for diaphoresis. Negative for fever.        (-) Generalized pain   HENT:  Negative for sore throat.    Respiratory:  Negative for shortness of breath.    Cardiovascular:  Positive for palpitations. Negative for chest pain.   Gastrointestinal:  Negative for diarrhea, nausea and vomiting.   Genitourinary:  Negative for dysuria.   Musculoskeletal:  Negative for back pain.   Skin:  Negative for rash.   Neurological:  Positive for weakness (Generalized). Negative for dizziness.   Hematological:  Does not bruise/bleed easily.   All other systems reviewed and are negative.     Physical Exam     Initial Vitals [04/28/25 2011]   BP Pulse Resp Temp SpO2   (!) 162/82 83 20 98.4 °F (36.9 °C) 100 %      MAP       --          Physical Exam  Nursing Notes and Vital Signs Reviewed.  Constitutional: Patient is in no acute distress. Well-developed and well-nourished.  Head: Atraumatic. Normocephalic.  Eyes: PERRL. EOM intact. Conjunctivae are not pale. No scleral icterus.  ENT: Mucous membranes are moist. Oropharynx is clear and symmetric.    Neck: Supple. Full ROM. No lymphadenopathy.  Cardiovascular: Regular rate. Regular rhythm. No murmurs, rubs, or gallops. Distal pulses are 2+ and symmetric.  Pulmonary/Chest: No respiratory distress. Clear to auscultation bilaterally. No wheezing or rales.  Abdominal: Soft and non-distended.  There is no tenderness.  No rebound, guarding, or rigidity. Good bowel sounds.  Genitourinary: No CVA tenderness.  Musculoskeletal: Moves all extremities. No obvious deformities. No edema. No calf tenderness.  Skin: Warm and dry.  Neurological:  Alert, awake, and appropriate.  Normal speech.  No acute focal neurological deficits are appreciated.  Psychiatric: Normal affect. Good eye contact. Appropriate in content.     ED Course   Procedures  ED Vital Signs:  Vitals:     "04/28/25 2011 04/28/25 2245 04/28/25 2300 04/28/25 2330   BP: (!) 162/82 136/77 118/71 117/61   Pulse: 83 81 78 74   Resp: 20 (!) 21 (!) 23 (!) 22   Temp: 98.4 °F (36.9 °C)      TempSrc: Oral      SpO2: 100% 98% 97% 99%   Weight: (!) 165.5 kg (364 lb 12.8 oz)      Height: 6' 3" (1.905 m)       04/29/25 0000   BP: 123/67   Pulse: 74   Resp: (!) 21   Temp:    TempSrc:    SpO2: 99%   Weight:    Height:        Abnormal Lab Results:  Labs Reviewed   COMPREHENSIVE METABOLIC PANEL - Abnormal       Result Value    Sodium 137      Potassium 4.2      Chloride 105      CO2 22 (*)     Glucose 313 (*)     BUN 15      Creatinine 0.8      Calcium 9.1      Protein Total 7.3      Albumin 3.7      Bilirubin Total 0.4            AST 26      ALT 37      Anion Gap 10      eGFR >60     CBC WITH DIFFERENTIAL - Abnormal    WBC 8.34      RBC 4.86      HGB 13.6 (*)     HCT 40.0      MCV 82      MCH 28.0      MCHC 34.0      RDW 14.1      Platelet Count 207      MPV 10.0      Nucleated RBC 0      Neut % 50.2      Lymph % 40.3      Mono % 4.7      Eos % 3.5      Basophil % 0.6      Imm Grans % 0.7 (*)     Neut # 4.19      Lymph # 3.36      Mono # 0.39      Eos # 0.29      Baso # 0.05      Imm Grans # 0.06 (*)    POCT GLUCOSE - Abnormal    POCT Glucose 245 (*)    TROPONIN I - Normal    Troponin-I 0.006     B-TYPE NATRIURETIC PEPTIDE - Normal    BNP <10     TSH - Normal    TSH 1.059     CBC W/ AUTO DIFFERENTIAL    Narrative:     The following orders were created for panel order CBC auto differential.  Procedure                               Abnormality         Status                     ---------                               -----------         ------                     CBC with Differential[[id number]]       Abnormal            Final result                 Please view results for these tests on the individual orders.        All Lab Results:  Results for orders placed or performed during the hospital encounter of 04/28/25   Comprehensive " metabolic panel    Collection Time: 04/28/25  9:56 PM   Result Value Ref Range    Sodium 137 136 - 145 mmol/L    Potassium 4.2 3.5 - 5.1 mmol/L    Chloride 105 95 - 110 mmol/L    CO2 22 (L) 23 - 29 mmol/L    Glucose 313 (H) 70 - 110 mg/dL    BUN 15 6 - 20 mg/dL    Creatinine 0.8 0.5 - 1.4 mg/dL    Calcium 9.1 8.7 - 10.5 mg/dL    Protein Total 7.3 6.0 - 8.4 gm/dL    Albumin 3.7 3.5 - 5.2 g/dL    Bilirubin Total 0.4 0.1 - 1.0 mg/dL     40 - 150 unit/L    AST 26 11 - 45 unit/L    ALT 37 10 - 44 unit/L    Anion Gap 10 8 - 16 mmol/L    eGFR >60 >60 mL/min/1.73/m2   Troponin I #1    Collection Time: 04/28/25  9:56 PM   Result Value Ref Range    Troponin-I 0.006 <=0.026 ng/mL   BNP    Collection Time: 04/28/25  9:56 PM   Result Value Ref Range    BNP <10 0 - 99 pg/mL   CBC with Differential    Collection Time: 04/28/25  9:56 PM   Result Value Ref Range    WBC 8.34 3.90 - 12.70 K/uL    RBC 4.86 4.60 - 6.20 M/uL    HGB 13.6 (L) 14.0 - 18.0 gm/dL    HCT 40.0 40.0 - 54.0 %    MCV 82 82 - 98 fL    MCH 28.0 27.0 - 31.0 pg    MCHC 34.0 32.0 - 36.0 g/dL    RDW 14.1 11.5 - 14.5 %    Platelet Count 207 150 - 450 K/uL    MPV 10.0 9.2 - 12.9 fL    Nucleated RBC 0 <=0 /100 WBC    Neut % 50.2 38 - 73 %    Lymph % 40.3 18 - 48 %    Mono % 4.7 4 - 15 %    Eos % 3.5 <=8 %    Basophil % 0.6 <=1.9 %    Imm Grans % 0.7 (H) 0.0 - 0.5 %    Neut # 4.19 1.8 - 7.7 K/uL    Lymph # 3.36 1 - 4.8 K/uL    Mono # 0.39 0.3 - 1 K/uL    Eos # 0.29 <=0.5 K/uL    Baso # 0.05 <=0.2 K/uL    Imm Grans # 0.06 (H) 0.00 - 0.04 K/uL   TSH    Collection Time: 04/28/25  9:56 PM   Result Value Ref Range    TSH 1.059 0.400 - 4.000 uIU/mL   POCT glucose    Collection Time: 04/29/25 12:19 AM   Result Value Ref Range    POCT Glucose 245 (H) 70 - 110 mg/dL       Imaging Results:  Imaging Results              X-Ray Chest AP Portable (Final result)  Result time 04/28/25 21:16:41      Final result by Tyson Pink MD (04/28/25 21:16:41)                   Impression:      No acute process.    Finalized on: 4/28/2025 9:16 PM By:  Tyson Pink MD THUAN PhD  [facility]# [id number]      2025-04-28 21:18:42.348     [facility]               Narrative:    EXAM: XR CHEST AP PORTABLE    CLINICAL HISTORY: Pain    FINDINGS:  Lungs are clear.  Cardiac silhouette within normal. No acute osseous injury.  Prominent epicardial fat pad.                                         The EKG was ordered, reviewed, and independently interpreted by the ED provider.  Interpretation time: 20:12  Rate: 81 BPM  Rhythm: normal sinus rhythm  Interpretation: Cannot rule out anterior infarct. Abnormal ECG. No STEMI.           The Emergency Provider reviewed the vital signs and test results, which are outlined above.     ED Discussion     11:10 PM: Re-evaluated pt.  at bedside. Patient is resting comfortably and is in no acute distress. Discussed with pt and/or family/caretaker all pertinent results. Discussed with pt and/or family/caretaker any concerns expressed at this time. Answered all questions. Pt and/or family/caretaker express understanding at this time.    12:15 AM: Reassessed pt at this time. Discussed with patient and/or family/caretaker all pertinent ED information and results. Discussed pt dx and plan of tx. Gave the patient all f/u and return to the ED instructions. All questions and concerns were addressed at this time. Patient and/or family/caretaker expresses understanding of information and instructions, and is comfortable with plan to discharge. Pt is stable for discharge.     I discussed with patient and/or family/caretaker that evaluation in the ED does not suggest any emergent or life threatening medical conditions requiring immediate intervention beyond what was provided in the ED, and I believe patient is safe for discharge.  Regardless, an unremarkable evaluation in the ED does not preclude the development or presence of a serious of life threatening condition. As such, I instructed that the patient is  to return immediately for any worsening or change in current symptoms.        Medical Decision Making  Amount and/or Complexity of Data Reviewed  Labs: ordered. Decision-making details documented in ED Course.  Radiology: ordered. Decision-making details documented in ED Course.                ED Medication(s):  Medications   sodium chloride 0.9% bolus 1,000 mL 1,000 mL (0 mLs Intravenous Stopped 4/29/25 0007)       Discharge Medication List as of 4/29/2025 12:29 AM           Follow-up Information       [facility] In 2 days.    Specialty: Cardiology  Contact information:  [id number] [facility] [id number] [phone]             [facility] - Emergency Dept..    Specialty: Emergency Medicine  Why: As needed, If symptoms worsen  Contact information:  [address] [facility] [id number] [phone]                               Scribe Attestation:   Scribe #1: I performed the above scribed service and the documentation accurately describes the services I performed. I attest to the accuracy of the note.     Attending:   Physician Attestation Statement for Scribe #1: I, Flor Levine MD, personally performed the services described in this documentation, as scribed by Linda Schrader, in my presence, and it is both accurate and complete.           Clinical Impression       ICD-10-CM ICD-9-CM   1. Palpitations  R00.2 785.1   2. Rapid heart beat  R00.0 785.0   3. Hyperglycemia due to diabetes mellitus  E11.65 250.02       Disposition:   Disposition: Discharged  Condition: Stable        Flor Levine MD  04/29/25 0552

## 2025-04-29 NOTE — FIRST PROVIDER EVALUATION
"Medical screening examination initiated.  I have conducted a focused provider triage encounter, findings are as follows:    Brief history of present illness:  50-year-old male presenting to the emergency department with complaints of palpitations x3 days.  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  Reports that he has had symptoms like this before, which were secondary to dehydration.  Patient states that the palpitations are constant.    Vitals:    04/28/25 2011   BP: (!) 162/82   Pulse: 83   Resp: 20   Temp: 98.4 °F (36.9 °C)   TempSrc: Oral   SpO2: 100%   Weight: (!) 165.5 kg (364 lb 12.8 oz)   Height: 6' 3" (1.905 m)       Pertinent physical exam:  Vital signs stable.  No acute distress.  Respirations even and unlabored.  Regular rate and rhythm.  ECG performed in triage.    Brief workup plan:  Preliminary workup initiated; this workup will be continued and followed by the physician or advanced practice provider that is assigned to the patient when roomed.  "

## 2025-08-15 ENCOUNTER — TELEPHONE (OUTPATIENT)
Dept: PODIATRY | Facility: CLINIC | Age: 50
End: 2025-08-15
Payer: COMMERCIAL

## 2025-08-15 ENCOUNTER — OFFICE VISIT (OUTPATIENT)
Dept: PODIATRY | Facility: CLINIC | Age: 50
End: 2025-08-15
Payer: COMMERCIAL

## 2025-08-15 VITALS — WEIGHT: 315 LBS | HEIGHT: 75 IN | BODY MASS INDEX: 39.17 KG/M2

## 2025-08-15 DIAGNOSIS — T14.8XXA HEMATOMA OF SKIN: Primary | ICD-10-CM

## 2025-08-15 DIAGNOSIS — R23.4 FISSURE IN SKIN OF FOOT: ICD-10-CM

## 2025-08-15 PROCEDURE — 99999 PR PBB SHADOW E&M-EST. PATIENT-LVL IV: CPT | Mod: PBBFAC,,, | Performed by: PODIATRIST
